# Patient Record
Sex: FEMALE | Race: WHITE | NOT HISPANIC OR LATINO | Employment: PART TIME | URBAN - METROPOLITAN AREA
[De-identification: names, ages, dates, MRNs, and addresses within clinical notes are randomized per-mention and may not be internally consistent; named-entity substitution may affect disease eponyms.]

---

## 2020-09-28 ENCOUNTER — OFFICE VISIT (OUTPATIENT)
Dept: OBSTETRICS AND GYNECOLOGY | Facility: CLINIC | Age: 80
End: 2020-09-28

## 2020-09-28 VITALS
WEIGHT: 129 LBS | SYSTOLIC BLOOD PRESSURE: 136 MMHG | DIASTOLIC BLOOD PRESSURE: 88 MMHG | BODY MASS INDEX: 22.86 KG/M2 | HEIGHT: 63 IN

## 2020-09-28 DIAGNOSIS — N81.6 CYSTOCELE WITH RECTOCELE: Primary | ICD-10-CM

## 2020-09-28 DIAGNOSIS — N76.0 ACUTE VAGINITIS: ICD-10-CM

## 2020-09-28 DIAGNOSIS — N81.10 CYSTOCELE WITH RECTOCELE: Primary | ICD-10-CM

## 2020-09-28 PROCEDURE — 99213 OFFICE O/P EST LOW 20 MIN: CPT | Performed by: OBSTETRICS & GYNECOLOGY

## 2020-09-28 RX ORDER — METRONIDAZOLE 7.5 MG/G
GEL VAGINAL
Qty: 70 G | Refills: 0 | Status: SHIPPED | OUTPATIENT
Start: 2020-09-28 | End: 2020-10-03

## 2020-09-28 RX ORDER — BRIMONIDINE TARTRATE/TIMOLOL 0.2%-0.5%
1 DROPS OPHTHALMIC (EYE) EVERY 12 HOURS
COMMUNITY

## 2020-09-28 RX ORDER — LATANOPROST 50 UG/ML
1 SOLUTION/ DROPS OPHTHALMIC DAILY
COMMUNITY

## 2020-09-28 RX ORDER — FOLIC ACID 1 MG/1
1 TABLET ORAL DAILY
COMMUNITY

## 2020-09-28 RX ORDER — METHOTREXATE 2.5 MG/1
12.5 TABLET ORAL WEEKLY
COMMUNITY

## 2020-09-28 RX ORDER — CONJUGATED ESTROGENS 0.62 MG/G
0.5 CREAM VAGINAL DAILY
COMMUNITY

## 2020-09-28 NOTE — PROGRESS NOTES
"Subjective   Chief Complaint   Patient presents with   • Gynecologic Exam     Postmenopausal bleeding     Kelle Edwards is a 80 y.o. year old who presents to be seen for follow-up of her pessary. Since she had her last visit she has had some spotting here and there but on Saturday she noticed blood in the toilet and on her pants. She denies any cramping or pain.     Overall she is unsure with how this pessary is doing.  She is not aware of it being present.  · She is not removing the pessary herself.  · She is able to empty were bladder without difficulty.  · There is not significant urinary incontinence.  She does not have trouble with urinary urgency or frequency.  · There is not significant vaginal discharge present.  She is using estrogen cream  3 times per week to help decrease her vaginal discharge.  · She is not having difficulty with bowel function.      OTHER THINGS SHE WANTS TO DISCUSS TODAY:  Vaginal bleeding that started 9/26/2020    The following portions of the patient's history were reviewed and updated as appropriate:current medications and allergies      Review of Systems   Constitutional: Negative.    HENT: Negative.    Eyes: Negative.    Respiratory: Negative.    Cardiovascular: Negative.    Gastrointestinal: Negative.    Endocrine: Negative.    Genitourinary: Positive for vaginal bleeding.   Musculoskeletal: Negative.    Skin: Negative.    Allergic/Immunologic: Negative.    Neurological: Negative.    Hematological: Negative.    Psychiatric/Behavioral: Negative.              Objective   /88   Ht 160 cm (63\")   Wt 58.5 kg (129 lb)   BMI 22.85 kg/m²     Physical Exam  Vitals signs and nursing note reviewed. Exam conducted with a chaperone present.   Constitutional:       General: She is not in acute distress.     Appearance: Normal appearance. She is well-developed and well-groomed. She is not ill-appearing or toxic-appearing.   HENT:      Head: Normocephalic and atraumatic.   Neck:      " Musculoskeletal: Normal range of motion.   Pulmonary:      Effort: Pulmonary effort is normal. No respiratory distress or retractions.   Abdominal:      Palpations: Abdomen is soft. There is no mass.      Tenderness: There is no abdominal tenderness. There is no guarding or rebound.   Genitourinary:     Exam position: Lithotomy position.      Pubic Area: No rash.       Labia:         Right: No rash, tenderness, lesion or injury.         Left: No rash, tenderness, lesion or injury.       Urethra: No prolapse, urethral pain, urethral swelling or urethral lesion.      Vagina: Signs of injury (lesion and injury noted most prominently in the right sulcus from pessary, these are moderate in nature and contact bleeding is noted, no active bleeding in vagina noted,  no obvious tear/cut noted, but only abrasions from pessary present) present. Foreign body: pessary removed and cleaned; pessary was given to pt to take home and keep. Erythema, bleeding and lesions present. No vaginal discharge or tenderness. Other prolapse of vaginal walls w/o mention of uterine prolapse: cystocele and rectocele present.      Cervix: Lesions: cervix absent.      Uterus: Absent.       Adnexa: Right adnexa normal and left adnexa normal.        Right: No mass, tenderness or fullness.          Left: No mass, tenderness or fullness.     Neurological:      Mental Status: She is alert and oriented to person, place, and time.   Psychiatric:         Mood and Affect: Mood and affect normal.         Speech: Speech normal.         Behavior: Behavior is cooperative.          Problem List Items Addressed This Visit        Digestive    Cystocele with rectocele - Primary    Relevant Medications    conjugated estrogens (Premarin) 0.625 MG/GM vaginal cream      Other Visit Diagnoses     Acute vaginitis                  Assessment   1. Symptomatic prolapse - well controlled with current pessary.  2.      Plan   1. Her pessary was removed, cleaned and  replaced.  2. The importance of keeping all planned follow-up and taking all medications as prescribed was emphasized.  3. Return in about 2 weeks (around 10/12/2020) for Recheck.   4.  Pt with some erosions present, this was exacerbated by recent constipation.  Will leave pessary out for 2 weeks and will treat with metrogel and vagina E, pt to RTC 2 weeks for reexamination.               Nimo Luevano MD   September 28, 2020

## 2020-10-26 ENCOUNTER — OFFICE VISIT (OUTPATIENT)
Dept: OBSTETRICS AND GYNECOLOGY | Facility: CLINIC | Age: 80
End: 2020-10-26

## 2020-10-26 VITALS
SYSTOLIC BLOOD PRESSURE: 120 MMHG | BODY MASS INDEX: 23.03 KG/M2 | DIASTOLIC BLOOD PRESSURE: 70 MMHG | WEIGHT: 130 LBS | TEMPERATURE: 96.4 F

## 2020-10-26 DIAGNOSIS — N81.6 CYSTOCELE WITH RECTOCELE: Primary | ICD-10-CM

## 2020-10-26 DIAGNOSIS — N81.10 CYSTOCELE WITH RECTOCELE: Primary | ICD-10-CM

## 2020-10-26 PROCEDURE — 99212 OFFICE O/P EST SF 10 MIN: CPT | Performed by: OBSTETRICS & GYNECOLOGY

## 2020-10-26 NOTE — PROGRESS NOTES
Subjective   Chief Complaint   Patient presents with   • Follow-up     Kelle Edwards is a 80 y.o. year old who presents to be seen for follow-up of her pessary.    Overall she is satisfied with how this pessary is doing.  She is not aware of it being present.  · She is not removing the pessary herself.  · She is able to empty were bladder without difficulty.  · There is not significant urinary incontinence.  She does not have trouble with urinary urgency or frequency.  · There is not significant vaginal discharge present.  She is using Trimosan cream 2 times per week and estrogen cream  1 times per week to help decrease her vaginal discharge.  · She is not having difficulty with bowel function.     OTHER THINGS SHE WANTS TO DISCUSS TODAY:  Nothing else    The following portions of the patient's history were reviewed and updated as appropriate:current medications and allergies      Review of Systems   All other systems reviewed and are negative.            Objective   /70   Temp 96.4 °F (35.8 °C)   Wt 59 kg (130 lb)   BMI 23.03 kg/m²     Physical Exam  Vitals signs and nursing note reviewed. Exam conducted with a chaperone present.   Constitutional:       Appearance: Normal appearance. She is normal weight.   HENT:      Head: Normocephalic and atraumatic.   Pulmonary:      Effort: No respiratory distress or retractions.   Genitourinary:     General: Normal vulva.      Exam position: Lithotomy position.      Pubic Area: No rash.       Labia:         Right: No rash, tenderness, lesion or injury.         Left: No rash, tenderness, lesion or injury.       Vagina: No foreign body. No erythema, tenderness or bleeding. Vaginal discharge: the previously noted areas of bleeding and irritation are much improved.  One area in right sulcus was mildly irritated, silver nitrate applied.      Uterus: Absent.    Neurological:      Mental Status: She is alert.          Problems Addressed this Visit        Digestive     Cystocele with rectocele - Primary      Diagnoses       Codes Comments    Cystocele with rectocele    -  Primary ICD-10-CM: N81.10, N81.6  ICD-9-CM: 618.01, 618.04               Assessment   1. Symptomatic prolapse - well controlled with current pessary.     Plan   1. Her pessary was attempted to be replaced today, but was too large.  PT will bring her other pessary to clinic in 1-2 weeks and we will see if that fits better.  I believe the smaller pessary will be optimal.  2. The importance of keeping all planned follow-up and taking all medications as prescribed was emphasized.  3. Return for Recheck in 1-2 weeks.                Nimo Luevano MD   October 26, 2020

## 2020-11-09 ENCOUNTER — OFFICE VISIT (OUTPATIENT)
Dept: OBSTETRICS AND GYNECOLOGY | Facility: CLINIC | Age: 80
End: 2020-11-09

## 2020-11-09 VITALS
SYSTOLIC BLOOD PRESSURE: 132 MMHG | DIASTOLIC BLOOD PRESSURE: 78 MMHG | BODY MASS INDEX: 23.56 KG/M2 | TEMPERATURE: 97.8 F | WEIGHT: 133 LBS

## 2020-11-09 DIAGNOSIS — N81.10 CYSTOCELE WITH RECTOCELE: Primary | ICD-10-CM

## 2020-11-09 DIAGNOSIS — N81.6 CYSTOCELE WITH RECTOCELE: Primary | ICD-10-CM

## 2020-11-09 PROCEDURE — 99213 OFFICE O/P EST LOW 20 MIN: CPT | Performed by: OBSTETRICS & GYNECOLOGY

## 2020-11-09 NOTE — PROGRESS NOTES
Subjective   Chief Complaint   Patient presents with   • Pessary Check     Kelle Edwards is a 80 y.o. year old who presents to be seen for follow-up of her pessary.    Overall she is satisfied with how this pessary is doing.  She is not aware of it being present.  · She is removing the pessary herself.  · She is able to empty were bladder without difficulty.  · There is not significant urinary incontinence.  She does not have trouble with urinary urgency or frequency.  · There is not significant vaginal discharge present.  She is using Trimosan cream 2 times per week and estrogen cream  1 times per week to help decrease her vaginal discharge.  She is not having difficulty with bowel function.   Pt is here to have her pessary put in.  She brought 2 pessary from home.    PT States she is uncomfortable without them.     OTHER THINGS SHE WANTS TO DISCUSS TODAY:  Nothing else    The following portions of the patient's history were reviewed and updated as appropriate:no additional history reviewed    Past Medical History:   Diagnosis Date   • Hypertension    • Rheumatoid arthritis (CMS/HCC)      Past Surgical History:   Procedure Laterality Date   • BREAST BIOPSY     • CYSTOSCOPY      Dr Mathews   • KNEE SURGERY      broken leg - has plates and screws   • SPLENECTOMY     • VAGINAL HYSTERECTOMY SALPINGO OOPHORECTOMY      late 90's or early 2000's         Review of Systems   All other systems reviewed and are negative.            Objective   /78   Temp 97.8 °F (36.6 °C)   Wt 60.3 kg (133 lb)   BMI 23.56 kg/m²     Physical Exam  Vitals signs and nursing note reviewed. Exam conducted with a chaperone present.   Constitutional:       General: She is not in acute distress.     Appearance: Normal appearance. She is normal weight.   HENT:      Head: Normocephalic and atraumatic.   Pulmonary:      Effort: Pulmonary effort is normal. No accessory muscle usage or respiratory distress.   Genitourinary:     General: Normal vulva.       Exam position: Lithotomy position.      Pubic Area: No rash.       Labia:         Right: No rash, tenderness, lesion or injury.         Left: No rash, tenderness, lesion or injury.       Vagina: No foreign body. Lesions (previously noted erosion is much improved, well healed) present. No vaginal discharge, erythema or tenderness. Bleeding: contact bleeding noted. Other prolapse of vaginal walls w/o mention of uterine prolapse: cystocele and rectocele noted.      Uterus: Absent.       Adnexa: Right adnexa normal and left adnexa normal.   Neurological:      Mental Status: She is alert.          Problems Addressed this Visit     None      Diagnoses    None.             Assessment   1. Symptomatic prolapse - {DESC; WELL/FAIRLY      Plan   1. She has not had her pessary in for some time and is overall doing well. We tried multiple pessaries today, but none fit well.  She desires expt mgt for now and will RTC if she desires another trial of pessary.  Continue vaginal E for now.   2. The importance of keeping all planned follow-up and taking all medications as prescribed was emphasized.  3. No follow-ups on file.                Aziza Khan   November 9, 2020

## 2021-02-08 ENCOUNTER — TELEPHONE (OUTPATIENT)
Dept: OBSTETRICS AND GYNECOLOGY | Facility: CLINIC | Age: 81
End: 2021-02-08

## 2021-02-08 NOTE — TELEPHONE ENCOUNTER
Patient is having issues and is currently leaking blood and etc. She was here and couldn't get her pessary placed so she has questions on what she should do or if she needs to be seen.

## 2021-02-22 ENCOUNTER — OFFICE VISIT (OUTPATIENT)
Dept: OBSTETRICS AND GYNECOLOGY | Facility: CLINIC | Age: 81
End: 2021-02-22

## 2021-02-22 VITALS
HEIGHT: 63 IN | WEIGHT: 133 LBS | BODY MASS INDEX: 23.57 KG/M2 | DIASTOLIC BLOOD PRESSURE: 78 MMHG | SYSTOLIC BLOOD PRESSURE: 118 MMHG

## 2021-02-22 DIAGNOSIS — N76.0 ACUTE VAGINITIS: Primary | ICD-10-CM

## 2021-02-22 DIAGNOSIS — Z12.31 BREAST CANCER SCREENING BY MAMMOGRAM: ICD-10-CM

## 2021-02-22 DIAGNOSIS — N81.6 CYSTOCELE WITH RECTOCELE: ICD-10-CM

## 2021-02-22 DIAGNOSIS — N81.10 CYSTOCELE WITH RECTOCELE: ICD-10-CM

## 2021-02-22 PROCEDURE — 99212 OFFICE O/P EST SF 10 MIN: CPT | Performed by: OBSTETRICS & GYNECOLOGY

## 2021-02-22 RX ORDER — METRONIDAZOLE 7.5 MG/G
GEL VAGINAL NIGHTLY
Qty: 70 G | Refills: 3 | Status: SHIPPED | OUTPATIENT
Start: 2021-02-22 | End: 2021-05-28

## 2021-02-22 RX ORDER — AMLODIPINE BESYLATE AND BENAZEPRIL HYDROCHLORIDE 5; 20 MG/1; MG/1
1 CAPSULE ORAL DAILY
COMMUNITY
Start: 2021-01-15

## 2021-02-22 RX ORDER — AMOXICILLIN 500 MG/1
CAPSULE ORAL
COMMUNITY
Start: 2020-12-14 | End: 2021-05-28

## 2021-02-22 NOTE — PROGRESS NOTES
Chief Complaint   Patient presents with   • Pessary Check       Subjective   HPI  Kelle Edwards is a 80 y.o. female, , who presents for pessary placement.      Her last LMP was No LMP recorded. Patient has had a hysterectomy. Patient reports problems with: none.  Partner Status: Marital Status: .  New Partners since last visit: no.  Desires STD Screening: no.    Additional OB/GYN History   Current contraception: contraceptive methods: Post menopausal status  Desires to: do not start contraception  Last Pap :   Last Completed Pap Smear     Patient has no health maintenance due at this time        History of abnormal Pap smear: no  Last mammogram:   Last Completed Mammogram     Patient has no health maintenance due at this time        Tobacco Usage?: No   OB History        2    Para   2    Term   2            AB        Living   2       SAB        TAB        Ectopic        Molar        Multiple        Live Births   2                Health Maintenance   Topic Date Due   • DXA SCAN  1940   • TDAP/TD VACCINES (1 - Tdap) 1960   • ZOSTER VACCINE (1 of 2) 1991   • Pneumococcal Vaccine 65+ (1 of 1 - PPSV23) 2006   • INFLUENZA VACCINE  2020   • HEPATITIS C SCREENING  2020   • ANNUAL WELLNESS VISIT  2020   • MENINGOCOCCAL VACCINE  Aged Out       Past Surgical History:   Procedure Laterality Date   • BREAST BIOPSY     • CYSTOSCOPY      Dr Mathews   • KNEE SURGERY      broken leg - has plates and screws   • SPLENECTOMY     • VAGINAL HYSTERECTOMY SALPINGO OOPHORECTOMY      late  or early          The additional following portions of the patient's history were reviewed and updated as appropriate: allergies, current medications, past family history, past medical history, past social history, past surgical history and problem list.    Review of Systems   Constitutional: Negative.    HENT: Negative.    Eyes: Negative.    Respiratory: Negative.    Cardiovascular:  "Negative.    Gastrointestinal: Negative.    Endocrine: Negative.    Genitourinary: Negative.    Musculoskeletal: Negative.    Skin: Negative.    Allergic/Immunologic: Negative.    Neurological: Negative.    Hematological: Negative.    Psychiatric/Behavioral: Negative.        I have reviewed and agree with the HPI, ROS, and historical information as entered above. Nimo Luevano MD    Objective   /78   Ht 160 cm (63\")   Wt 60.3 kg (133 lb)   Breastfeeding No   BMI 23.56 kg/m²     Physical Exam  Vitals signs and nursing note reviewed. Exam conducted with a chaperone present.   HENT:      Head: Normocephalic and atraumatic.   Pulmonary:      Effort: Pulmonary effort is normal. No accessory muscle usage.   Abdominal:      Palpations: Abdomen is soft.   Genitourinary:     General: Normal vulva.      Exam position: Lithotomy position.      Labia:         Right: No rash, tenderness or lesion.         Left: No rash, tenderness or lesion.       Urethra: No urethral pain, urethral swelling or urethral lesion.      Vagina: Normal. No vaginal discharge, erythema, tenderness, bleeding or lesions.      Uterus: Absent.       Rectum: No external hemorrhoid.      Comments: Pessary reinserted without difficulty; size 3 ring with support was placed today with good placement and fit.  Pt was able to maintain pessary in place with movement, straining and was able to void without difficulty and retained the pessary well.   Neurological:      Mental Status: She is alert and oriented to person, place, and time.   Psychiatric:         Mood and Affect: Mood and affect normal.         Speech: Speech normal.         Assessment/Plan     Assessment     Problem List Items Addressed This Visit        Genitourinary and Reproductive     Cystocele with rectocele    Relevant Medications    conjugated estrogens (Premarin) 0.625 MG/GM vaginal cream    metroNIDAZOLE (METROGEL VAGINAL) 0.75 % vaginal gel      Other Visit Diagnoses     Acute " vaginitis    -  Primary    Relevant Medications    metroNIDAZOLE (METROGEL VAGINAL) 0.75 % vaginal gel    Breast cancer screening by mammogram        Relevant Orders    Mammo Screening Digital Tomosynthesis Bilateral With CAD            Plan     Return in about 3 months (around 5/22/2021) for Recheck.  1. Size 3 ring with support placed today and fit well.      Nimo Luevano MD  02/22/2021

## 2021-03-23 DIAGNOSIS — N81.10 CYSTOCELE WITH RECTOCELE: Primary | ICD-10-CM

## 2021-03-23 DIAGNOSIS — N81.6 CYSTOCELE WITH RECTOCELE: Primary | ICD-10-CM

## 2021-05-05 ENCOUNTER — APPOINTMENT (OUTPATIENT)
Dept: OTHER | Facility: HOSPITAL | Age: 81
End: 2021-05-05

## 2021-05-05 ENCOUNTER — HOSPITAL ENCOUNTER (OUTPATIENT)
Dept: MAMMOGRAPHY | Facility: HOSPITAL | Age: 81
Discharge: HOME | End: 2021-05-05
Admitting: OBSTETRICS & GYNECOLOGY

## 2021-05-05 DIAGNOSIS — Z12.31 BREAST CANCER SCREENING BY MAMMOGRAM: ICD-10-CM

## 2021-05-05 PROCEDURE — 77063 BREAST TOMOSYNTHESIS BI: CPT

## 2021-05-05 PROCEDURE — 77067 SCR MAMMO BI INCL CAD: CPT | Performed by: RADIOLOGY

## 2021-05-05 PROCEDURE — 77063 BREAST TOMOSYNTHESIS BI: CPT | Performed by: RADIOLOGY

## 2021-05-05 PROCEDURE — 77067 SCR MAMMO BI INCL CAD: CPT

## 2021-05-21 ENCOUNTER — HOSPITAL ENCOUNTER (OUTPATIENT)
Dept: ULTRASOUND IMAGING | Facility: HOSPITAL | Age: 81
Discharge: HOME | End: 2021-05-21

## 2021-05-21 ENCOUNTER — HOSPITAL ENCOUNTER (OUTPATIENT)
Dept: MAMMOGRAPHY | Facility: HOSPITAL | Age: 81
Discharge: HOME | End: 2021-05-21

## 2021-05-21 ENCOUNTER — TRANSCRIBE ORDERS (OUTPATIENT)
Dept: MAMMOGRAPHY | Facility: HOSPITAL | Age: 81
End: 2021-05-21

## 2021-05-21 DIAGNOSIS — R92.8 ABNORMAL MAMMOGRAM: ICD-10-CM

## 2021-05-21 DIAGNOSIS — R92.8 ABNORMAL MAMMOGRAM: Primary | ICD-10-CM

## 2021-05-21 PROCEDURE — 77065 DX MAMMO INCL CAD UNI: CPT

## 2021-05-21 PROCEDURE — 76642 ULTRASOUND BREAST LIMITED: CPT | Performed by: RADIOLOGY

## 2021-05-21 PROCEDURE — G0279 TOMOSYNTHESIS, MAMMO: HCPCS

## 2021-05-21 PROCEDURE — 76642 ULTRASOUND BREAST LIMITED: CPT

## 2021-05-21 PROCEDURE — 77065 DX MAMMO INCL CAD UNI: CPT | Performed by: RADIOLOGY

## 2021-05-21 PROCEDURE — G0279 TOMOSYNTHESIS, MAMMO: HCPCS | Performed by: RADIOLOGY

## 2021-05-28 ENCOUNTER — HOSPITAL ENCOUNTER (OUTPATIENT)
Dept: CARDIOLOGY | Facility: HOSPITAL | Age: 81
Discharge: HOME | End: 2021-05-28
Admitting: PHYSICIAN ASSISTANT

## 2021-05-28 ENCOUNTER — OFFICE VISIT (OUTPATIENT)
Dept: CARDIOLOGY | Facility: CLINIC | Age: 81
End: 2021-05-28

## 2021-05-28 ENCOUNTER — ANESTHESIA EVENT (OUTPATIENT)
Dept: PERIOP | Facility: HOSPITAL | Age: 81
End: 2021-05-28

## 2021-05-28 ENCOUNTER — PRE-ADMISSION TESTING (OUTPATIENT)
Dept: PREADMISSION TESTING | Facility: HOSPITAL | Age: 81
End: 2021-05-28

## 2021-05-28 VITALS
OXYGEN SATURATION: 98 % | SYSTOLIC BLOOD PRESSURE: 132 MMHG | HEART RATE: 69 BPM | WEIGHT: 130 LBS | BODY MASS INDEX: 23.92 KG/M2 | HEIGHT: 62 IN | DIASTOLIC BLOOD PRESSURE: 82 MMHG

## 2021-05-28 VITALS — BODY MASS INDEX: 24.02 KG/M2 | HEIGHT: 62 IN | WEIGHT: 130.51 LBS

## 2021-05-28 DIAGNOSIS — R06.02 SOB (SHORTNESS OF BREATH): Primary | ICD-10-CM

## 2021-05-28 DIAGNOSIS — R06.02 SOB (SHORTNESS OF BREATH): ICD-10-CM

## 2021-05-28 LAB
ANION GAP SERPL CALCULATED.3IONS-SCNC: 9 MMOL/L (ref 5–15)
BUN SERPL-MCNC: 15 MG/DL (ref 8–23)
BUN/CREAT SERPL: 25.4 (ref 7–25)
CALCIUM SPEC-SCNC: 9.7 MG/DL (ref 8.6–10.5)
CHLORIDE SERPL-SCNC: 105 MMOL/L (ref 98–107)
CO2 SERPL-SCNC: 25 MMOL/L (ref 22–29)
CREAT SERPL-MCNC: 0.59 MG/DL (ref 0.57–1)
DEPRECATED RDW RBC AUTO: 49.4 FL (ref 37–54)
ERYTHROCYTE [DISTWIDTH] IN BLOOD BY AUTOMATED COUNT: 14.6 % (ref 12.3–15.4)
GFR SERPL CREATININE-BSD FRML MDRD: 98 ML/MIN/1.73
GLUCOSE SERPL-MCNC: 111 MG/DL (ref 65–99)
HBA1C MFR BLD: 5.3 % (ref 4.8–5.6)
HCT VFR BLD AUTO: 40 % (ref 34–46.6)
HGB BLD-MCNC: 12.9 G/DL (ref 12–15.9)
MCH RBC QN AUTO: 30.1 PG (ref 26.6–33)
MCHC RBC AUTO-ENTMCNC: 32.3 G/DL (ref 31.5–35.7)
MCV RBC AUTO: 93.2 FL (ref 79–97)
PLATELET # BLD AUTO: 295 10*3/MM3 (ref 140–450)
PMV BLD AUTO: 10.8 FL (ref 6–12)
POTASSIUM SERPL-SCNC: 4.6 MMOL/L (ref 3.5–5.2)
QT INTERVAL: 498 MS
QTC INTERVAL: 444 MS
RBC # BLD AUTO: 4.29 10*6/MM3 (ref 3.77–5.28)
SODIUM SERPL-SCNC: 139 MMOL/L (ref 136–145)
WBC # BLD AUTO: 5.69 10*3/MM3 (ref 3.4–10.8)

## 2021-05-28 PROCEDURE — 36415 COLL VENOUS BLD VENIPUNCTURE: CPT

## 2021-05-28 PROCEDURE — 80048 BASIC METABOLIC PNL TOTAL CA: CPT

## 2021-05-28 PROCEDURE — 93005 ELECTROCARDIOGRAM TRACING: CPT

## 2021-05-28 PROCEDURE — 83036 HEMOGLOBIN GLYCOSYLATED A1C: CPT

## 2021-05-28 PROCEDURE — 99203 OFFICE O/P NEW LOW 30 MIN: CPT | Performed by: PHYSICIAN ASSISTANT

## 2021-05-28 PROCEDURE — 85027 COMPLETE CBC AUTOMATED: CPT

## 2021-05-28 PROCEDURE — 93010 ELECTROCARDIOGRAM REPORT: CPT | Performed by: INTERNAL MEDICINE

## 2021-05-28 PROCEDURE — 93306 TTE W/DOPPLER COMPLETE: CPT

## 2021-05-28 PROCEDURE — 93306 TTE W/DOPPLER COMPLETE: CPT | Performed by: INTERNAL MEDICINE

## 2021-05-28 PROCEDURE — 93000 ELECTROCARDIOGRAM COMPLETE: CPT | Performed by: PHYSICIAN ASSISTANT

## 2021-05-28 NOTE — PROGRESS NOTES
[facility]  INITIAL OFFICE CONSULT      Kelle Edwards  1940  PCP: dE Jung MD    SUBJECTIVE:   Kelle Edwards is a 81 y.o. female seen for a consultation visit regarding the following:     Chief Complaint:   Chief Complaint   Patient presents with   • cardiac clearance     bladder tack  June 2nd          Consultation is requested by No ref. provider found for evaluation of cardiac clearance (bladder tack  June 2nd)        History:  Pleasant 81-year-old female presents today for consultation preop clearance prior to bladder tack surgery at request of Dr. Russell.  The patient is a delightful person who is quite active and enjoys exercising and doing things around the house.  She states she is never had any previous cardiac history.  She does not recall ever having EKG.  She denies having any chest pain or chest tightness that suggest angina pectoris.  She denies any heart failure symptoms such as orthopnea PND presently.  She denies any fatigue weakness dizziness near syncope or syncope.  She denies any palpitations.  Overall she feels she is in good health she was exercise quite frequently but started having some difficulty with her bladder dysfunction and therefore is now seeking out a procedure to improve this.  She states she underwent preadmission testing evaluation today with an EKG that was considered abnormal with left bundle branch block.  In view this findings referred to our office.      Cardiac PMH: (Old records have been reviewed and summarized below)  1. Pre op evaluation  2. LBBB  3. Asymptomatic sinus bradycardia first-degree AV block  4. HTN  5. Bladder dysfunction.         Past Medical History, Past Surgical History, Family history, Social History, and Medications were all reviewed with the patient today and updated as necessary.     Current Outpatient Medications   Medication Sig Dispense Refill   • amLODIPine-benazepril (LOTREL 5-20) 5-20 MG per  capsule Take 1 capsule by mouth Daily.     • brimonidine-timolol (Combigan) 0.2-0.5 % ophthalmic solution Administer 1 drop to both eyes Every 12 (Twelve) Hours.     • CALCIUM PO Take 1 dose by mouth Daily.     • conjugated estrogens (Premarin) 0.625 MG/GM vaginal cream Insert 0.5 g into the vagina Daily.     • folic acid (FOLVITE) 1 MG tablet Take 1 mg by mouth Daily.     • latanoprost (XALATAN) 0.005 % ophthalmic solution Administer 1 drop to both eyes Daily.     • methotrexate 2.5 MG tablet Take 12.5 mg by mouth 1 (One) Time Per Week.     • Multiple Vitamins-Minerals (CENTRUM SILVER ULTRA WOMENS PO) Take 1 tablet by mouth Daily.       No current facility-administered medications for this visit.     No Known Allergies      Past Medical History:   Diagnosis Date   • Diverticulosis    • History of transfusion 1959    NO REACTION   • Hypertension    • Insomnia    • Rheumatoid arthritis (CMS/HCC)      Past Surgical History:   Procedure Laterality Date   • BREAST BIOPSY     • COLONOSCOPY  2018   • CYSTOSCOPY      Dr Mathews   • KNEE SURGERY      broken leg - has plates and screws   • OOPHORECTOMY     • SPLENECTOMY     • VAGINAL HYSTERECTOMY SALPINGO OOPHORECTOMY      late 90's or early 2000's     Family History   Problem Relation Age of Onset   • Heart disease Father    • Heart disease Brother    • Breast cancer Neg Hx    • Ovarian cancer Neg Hx      Social History     Tobacco Use   • Smoking status: Never Smoker   • Smokeless tobacco: Never Used   Substance Use Topics   • Alcohol use: Not Currently       ROS:  Review of Symptoms:  General: no recent weight loss/gain, weakness or fatigue  Skin: no rashes, lumps, or other skin changes  HEENT: no dizziness, lightheadedness, or vision changes  Respiratory: no cough or hemoptysis  Cardiovascular: no palpitations, and tachycardia  Gastrointestinal: no black/tarry stools or diarrhea  Urinary: no change in frequency or urgency  Peripheral Vascular: no claudication or leg  "cramps  Musculoskeletal: no muscle or joint pain/stiffness  Psychiatric: no depression or excessive stress  Neurological: no sensory or motor loss, no syncope  Hematologic: no anemia, easy bruising or bleeding  Endocrine: no thyroid problems, nor heat or cold intolerance         PHYSICAL EXAM:   /82   Pulse 69   Ht 157.5 cm (62.01\")   Wt 59 kg (130 lb)   SpO2 98%   BMI 23.77 kg/m²      Wt Readings from Last 5 Encounters:   05/28/21 59 kg (130 lb)   05/28/21 59.2 kg (130 lb 8.2 oz)   02/22/21 60.3 kg (133 lb)   11/09/20 60.3 kg (133 lb)   10/26/20 59 kg (130 lb)     BP Readings from Last 5 Encounters:   05/28/21 132/82   02/22/21 118/78   11/09/20 132/78   10/26/20 120/70   09/28/20 136/88       General-Well Nourished, Well developed  Eyes - PERRLA  Neck- supple, No mass  CV- regular rate and rhythm, no MRG  Lung- clear bilaterally  Abd- soft, +BS  Musc/skel - Norm strength and range of motion  Skin- warm and dry  Neuro - Alert & Oriented x 3, appropriate mood.    Patient's external notes were reviewed.  Independent interpretation of test performed by another physician in facility were reviewed.  Outside laboratory data was also reviewed.    Medical problems and test results were reviewed with the patient today.     Results for orders placed or performed in visit on 05/28/21   CBC (No Diff)    Specimen: Blood   Result Value Ref Range    WBC 5.69 3.40 - 10.80 10*3/mm3    RBC 4.29 3.77 - 5.28 10*6/mm3    Hemoglobin 12.9 12.0 - 15.9 g/dL    Hematocrit 40.0 34.0 - 46.6 %    MCV 93.2 79.0 - 97.0 fL    MCH 30.1 26.6 - 33.0 pg    MCHC 32.3 31.5 - 35.7 g/dL    RDW 14.6 12.3 - 15.4 %    RDW-SD 49.4 37.0 - 54.0 fl    MPV 10.8 6.0 - 12.0 fL    Platelets 295 140 - 450 10*3/mm3   Basic Metabolic Panel    Specimen: Blood   Result Value Ref Range    Glucose 111 (H) 65 - 99 mg/dL    BUN 15 8 - 23 mg/dL    Creatinine 0.59 0.57 - 1.00 mg/dL    Sodium 139 136 - 145 mmol/L    Potassium 4.6 3.5 - 5.2 mmol/L    Chloride 105 98 " - 107 mmol/L    CO2 25.0 22.0 - 29.0 mmol/L    Calcium 9.7 8.6 - 10.5 mg/dL    eGFR Non African Amer 98 >60 mL/min/1.73    BUN/Creatinine Ratio 25.4 (H) 7.0 - 25.0    Anion Gap 9.0 5.0 - 15.0 mmol/L   Hemoglobin A1c    Specimen: Blood   Result Value Ref Range    Hemoglobin A1C 5.30 4.80 - 5.60 %   ECG 12 Lead   Result Value Ref Range    QT Interval 498 ms    QTC Interval 444 ms         No results found for: CHOL, HDL, HDLC, LDL, LDLC, VLDL    EKG:  (EKG/Tracing has been independently visualized by me and summarized below)      ECG 12 Lead    Date/Time: 5/28/2021 2:48 PM  Performed by: Ferny Guillaume PA  Authorized by: Ferny Guillaume PA   Rhythm: sinus rhythm  Rate: bradycardic  Conduction: left bundle branch block and 1st degree AV block  QRS axis: normal    Clinical impression: abnormal EKG            ASSESSMENT   1.  Left bundle branch block  2.  Asymptomatic sinus bradycardia first-degree AV block  3.  Hypertension well-controlled current medical therapy.      PLAN  · Reviewed EKG with the patient.  She did denies having any symptoms of bradycardia she has no dizziness near syncope or syncope.  She states she is quite active has good energy.  She has no chest pain suggesting angina or heart failure symptoms.  Recommend pursuing echo according to LV function.  If this is normal will consider patient acceptable cardiovascular risk to pursue bladder tack surgery.       Cardiology/Electrophysiology  05/28/21  14:44 EDT  Will Markell BATES

## 2021-05-28 NOTE — PAT
An arrival time for procedure was not given during PAT visit. If patient had any questions or concerns about their arrival time, they were instructed to contact their surgeon/physician.  Additionally, if the patient referred to an arrival time that was acquired from their my chart account, patient was encouraged to verify that time with their surgeon/physician.  NO arrival times given in Pre Admission Testing Department.    Patient to apply Chlorhexadine wipes  to surgical area (as instructed) the night before procedure and the AM of procedure. Wipes provided.    PER DR. KOTHARI, PT NEEDS CARDIAC CLEARANCE. REPORTED NEED FOR CLEARANCE TO KENNETH IN DR. POWELL'S OFFICE. NO PREFERENCE FOR CARDIOLOGIST. LM WITH APPOINTMENT SCHEDULING AT [facility] TO SCHEDULE APPOINTMENT. PT AWARE THAT SHE WILL BE CALLED WITH A CARDIOLOGY APPOINTMENT AND VERBALIZED UNDERSTANDING.

## 2021-05-29 LAB
BH CV ECHO MEAS - AO MAX PG (FULL): 2.4 MMHG
BH CV ECHO MEAS - AO MAX PG: 7 MMHG
BH CV ECHO MEAS - AO MEAN PG (FULL): 1.3 MMHG
BH CV ECHO MEAS - AO MEAN PG: 3.4 MMHG
BH CV ECHO MEAS - AO ROOT AREA (BSA CORRECTED): 1.8
BH CV ECHO MEAS - AO ROOT AREA: 6.4 CM^2
BH CV ECHO MEAS - AO ROOT DIAM: 2.9 CM
BH CV ECHO MEAS - AO V2 MAX: 132 CM/SEC
BH CV ECHO MEAS - AO V2 MEAN: 85.2 CM/SEC
BH CV ECHO MEAS - AO V2 VTI: 29.1 CM
BH CV ECHO MEAS - ASC AORTA: 3.2 CM
BH CV ECHO MEAS - AVA(I,A): 2.5 CM^2
BH CV ECHO MEAS - AVA(I,D): 2.5 CM^2
BH CV ECHO MEAS - AVA(V,A): 2.4 CM^2
BH CV ECHO MEAS - AVA(V,D): 2.4 CM^2
BH CV ECHO MEAS - BSA(HAYCOCK): 1.6 M^2
BH CV ECHO MEAS - BSA: 1.6 M^2
BH CV ECHO MEAS - BZI_BMI: 23.8 KILOGRAMS/M^2
BH CV ECHO MEAS - BZI_METRIC_HEIGHT: 157.5 CM
BH CV ECHO MEAS - BZI_METRIC_WEIGHT: 59 KG
BH CV ECHO MEAS - EDV(CUBED): 103.6 ML
BH CV ECHO MEAS - EDV(MOD-SP2): 61 ML
BH CV ECHO MEAS - EDV(MOD-SP4): 95 ML
BH CV ECHO MEAS - EDV(TEICH): 102.2 ML
BH CV ECHO MEAS - EF(CUBED): 71.5 %
BH CV ECHO MEAS - EF(MOD-SP2): 65.6 %
BH CV ECHO MEAS - EF(MOD-SP4): 63.2 %
BH CV ECHO MEAS - EF(TEICH): 63.2 %
BH CV ECHO MEAS - ESV(CUBED): 29.5 ML
BH CV ECHO MEAS - ESV(MOD-SP2): 21 ML
BH CV ECHO MEAS - ESV(MOD-SP4): 35 ML
BH CV ECHO MEAS - ESV(TEICH): 37.6 ML
BH CV ECHO MEAS - FS: 34.2 %
BH CV ECHO MEAS - IVS/LVPW: 0.99
BH CV ECHO MEAS - IVSD: 0.69 CM
BH CV ECHO MEAS - LA DIMENSION: 3 CM
BH CV ECHO MEAS - LA/AO: 1.1
BH CV ECHO MEAS - LAD MAJOR: 4.5 CM
BH CV ECHO MEAS - LAT PEAK E' VEL: 9.8 CM/SEC
BH CV ECHO MEAS - LATERAL E/E' RATIO: 5.4
BH CV ECHO MEAS - LV DIASTOLIC VOL/BSA (35-75): 59.7 ML/M^2
BH CV ECHO MEAS - LV IVRT: 0.1 SEC
BH CV ECHO MEAS - LV MASS(C)D: 102.4 GRAMS
BH CV ECHO MEAS - LV MASS(C)DI: 64.4 GRAMS/M^2
BH CV ECHO MEAS - LV MAX PG: 4.5 MMHG
BH CV ECHO MEAS - LV MEAN PG: 2.1 MMHG
BH CV ECHO MEAS - LV SYSTOLIC VOL/BSA (12-30): 22 ML/M^2
BH CV ECHO MEAS - LV V1 MAX: 106.6 CM/SEC
BH CV ECHO MEAS - LV V1 MEAN: 64.5 CM/SEC
BH CV ECHO MEAS - LV V1 VTI: 24.1 CM
BH CV ECHO MEAS - LVIDD: 4.7 CM
BH CV ECHO MEAS - LVIDS: 3.1 CM
BH CV ECHO MEAS - LVLD AP2: 7.1 CM
BH CV ECHO MEAS - LVLD AP4: 7.6 CM
BH CV ECHO MEAS - LVLS AP2: 5.5 CM
BH CV ECHO MEAS - LVLS AP4: 6.7 CM
BH CV ECHO MEAS - LVOT AREA (M): 3.1 CM^2
BH CV ECHO MEAS - LVOT AREA: 3 CM^2
BH CV ECHO MEAS - LVOT DIAM: 2 CM
BH CV ECHO MEAS - LVPWD: 0.7 CM
BH CV ECHO MEAS - MED PEAK E' VEL: 9.1 CM/SEC
BH CV ECHO MEAS - MEDIAL E/E' RATIO: 5.8
BH CV ECHO MEAS - MV A MAX VEL: 99.7 CM/SEC
BH CV ECHO MEAS - MV DEC SLOPE: 285.8 CM/SEC^2
BH CV ECHO MEAS - MV DEC TIME: 0.26 SEC
BH CV ECHO MEAS - MV E MAX VEL: 53.8 CM/SEC
BH CV ECHO MEAS - MV E/A: 0.54
BH CV ECHO MEAS - MV P1/2T MAX VEL: 84.3 CM/SEC
BH CV ECHO MEAS - MV P1/2T: 86.3 MSEC
BH CV ECHO MEAS - MVA P1/2T LCG: 2.6 CM^2
BH CV ECHO MEAS - MVA(P1/2T): 2.5 CM^2
BH CV ECHO MEAS - PA ACC SLOPE: 875.3 CM/SEC^2
BH CV ECHO MEAS - PA ACC TIME: 0.1 SEC
BH CV ECHO MEAS - PA MAX PG: 3.5 MMHG
BH CV ECHO MEAS - PA PR(ACCEL): 33.8 MMHG
BH CV ECHO MEAS - PA V2 MAX: 93.3 CM/SEC
BH CV ECHO MEAS - PI END-D VEL: 65.6 CM/SEC
BH CV ECHO MEAS - RAP SYSTOLE: 3 MMHG
BH CV ECHO MEAS - RVSP: 37 MMHG
BH CV ECHO MEAS - SI(AO): 117.8 ML/M^2
BH CV ECHO MEAS - SI(CUBED): 46.6 ML/M^2
BH CV ECHO MEAS - SI(LVOT): 45.6 ML/M^2
BH CV ECHO MEAS - SI(MOD-SP2): 25.1 ML/M^2
BH CV ECHO MEAS - SI(MOD-SP4): 37.7 ML/M^2
BH CV ECHO MEAS - SI(TEICH): 40.6 ML/M^2
BH CV ECHO MEAS - SV(AO): 187.5 ML
BH CV ECHO MEAS - SV(CUBED): 74.1 ML
BH CV ECHO MEAS - SV(LVOT): 72.6 ML
BH CV ECHO MEAS - SV(MOD-SP2): 40 ML
BH CV ECHO MEAS - SV(MOD-SP4): 60 ML
BH CV ECHO MEAS - SV(TEICH): 64.6 ML
BH CV ECHO MEAS - TAPSE (>1.6): 2.2 CM
BH CV ECHO MEAS - TR MAX PG: 34 MMHG
BH CV ECHO MEAS - TR MAX VEL: 277.1 CM/SEC
BH CV ECHO MEASUREMENTS AVERAGE E/E' RATIO: 5.69
BH CV XLRA - RV BASE: 3.7 CM
BH CV XLRA - RV LENGTH: 7.6 CM
BH CV XLRA - RV MID: 2.9 CM
BH CV XLRA - TDI S': 18.4 CM/SEC
LEFT ATRIUM VOLUME INDEX: 13.2 ML/M2
LV EF 2D ECHO EST: 55 %
MAXIMAL PREDICTED HEART RATE: 140 BPM
STRESS TARGET HR: 119 BPM

## 2021-05-31 ENCOUNTER — APPOINTMENT (OUTPATIENT)
Dept: PREADMISSION TESTING | Facility: HOSPITAL | Age: 81
End: 2021-05-31

## 2021-05-31 LAB — SARS-COV-2 RNA PNL SPEC NAA+PROBE: NOT DETECTED

## 2021-05-31 PROCEDURE — U0004 COV-19 TEST NON-CDC HGH THRU: HCPCS

## 2021-05-31 PROCEDURE — C9803 HOPD COVID-19 SPEC COLLECT: HCPCS

## 2021-06-01 ENCOUNTER — TELEPHONE (OUTPATIENT)
Dept: CARDIOLOGY | Facility: CLINIC | Age: 81
End: 2021-06-01

## 2021-06-01 RX ORDER — FAMOTIDINE 10 MG/ML
20 INJECTION, SOLUTION INTRAVENOUS ONCE
Status: CANCELLED | OUTPATIENT
Start: 2021-06-01 | End: 2021-06-01

## 2021-06-01 NOTE — TELEPHONE ENCOUNTER
I received a call from Aishwarya with [facility] (p:[phone]) They are in need of the patients echo to be reviewed and cardiac clearance for the patients upcoming surgery tomorrow morning. We need to fax back a note to [phone])

## 2021-06-02 ENCOUNTER — HOSPITAL ENCOUNTER (OUTPATIENT)
Facility: HOSPITAL | Age: 81
Discharge: HOME | End: 2021-06-03
Attending: OBSTETRICS & GYNECOLOGY | Admitting: OBSTETRICS & GYNECOLOGY

## 2021-06-02 ENCOUNTER — ANESTHESIA (OUTPATIENT)
Dept: PERIOP | Facility: HOSPITAL | Age: 81
End: 2021-06-02

## 2021-06-02 DIAGNOSIS — N81.10 PELVIC RELAXATION DUE TO VAGINAL PROLAPSE: Primary | ICD-10-CM

## 2021-06-02 PROBLEM — N81.6 CYSTOCELE WITH RECTOCELE: Status: RESOLVED | Noted: 2020-09-28 | Resolved: 2021-06-02

## 2021-06-02 PROCEDURE — 25010000002 DEXAMETHASONE PER 1 MG: Performed by: NURSE ANESTHETIST, CERTIFIED REGISTERED

## 2021-06-02 PROCEDURE — 25010000002 KETOROLAC TROMETHAMINE PER 15 MG: Performed by: OBSTETRICS & GYNECOLOGY

## 2021-06-02 PROCEDURE — A9270 NON-COVERED ITEM OR SERVICE: HCPCS | Performed by: OBSTETRICS & GYNECOLOGY

## 2021-06-02 PROCEDURE — 25010000002 HEPARIN (PORCINE) PER 1000 UNITS: Performed by: OBSTETRICS & GYNECOLOGY

## 2021-06-02 PROCEDURE — 25010000002 ONDANSETRON PER 1 MG: Performed by: NURSE ANESTHETIST, CERTIFIED REGISTERED

## 2021-06-02 PROCEDURE — 63710000001: Performed by: OBSTETRICS & GYNECOLOGY

## 2021-06-02 PROCEDURE — 63710000001 LATANOPROST 0.005 % SOLUTION 2.5 ML BOTTLE: Performed by: OBSTETRICS & GYNECOLOGY

## 2021-06-02 PROCEDURE — 25010000003 CEFAZOLIN IN DEXTROSE 2-4 GM/100ML-% SOLUTION: Performed by: OBSTETRICS & GYNECOLOGY

## 2021-06-02 PROCEDURE — 63710000001 LISINOPRIL 10 MG TABLET 100 EACH BOX: Performed by: OBSTETRICS & GYNECOLOGY

## 2021-06-02 PROCEDURE — 63710000001 FOLIC ACID 1 MG TABLET: Performed by: OBSTETRICS & GYNECOLOGY

## 2021-06-02 PROCEDURE — 63710000001 AMLODIPINE 5 MG TABLET 100 EACH BOX: Performed by: OBSTETRICS & GYNECOLOGY

## 2021-06-02 PROCEDURE — 25010000002 FENTANYL CITRATE (PF) 50 MCG/ML SOLUTION: Performed by: NURSE ANESTHETIST, CERTIFIED REGISTERED

## 2021-06-02 PROCEDURE — 94799 UNLISTED PULMONARY SVC/PX: CPT

## 2021-06-02 PROCEDURE — 25010000002 PROPOFOL 10 MG/ML EMULSION: Performed by: NURSE ANESTHETIST, CERTIFIED REGISTERED

## 2021-06-02 PROCEDURE — 25010000002 SUCCINYLCHOLINE PER 20 MG: Performed by: NURSE ANESTHETIST, CERTIFIED REGISTERED

## 2021-06-02 RX ORDER — FENTANYL CITRATE 50 UG/ML
25 INJECTION, SOLUTION INTRAMUSCULAR; INTRAVENOUS
Status: DISCONTINUED | OUTPATIENT
Start: 2021-06-02 | End: 2021-06-02 | Stop reason: HOSPADM

## 2021-06-02 RX ORDER — IPRATROPIUM BROMIDE AND ALBUTEROL SULFATE 2.5; .5 MG/3ML; MG/3ML
3 SOLUTION RESPIRATORY (INHALATION) ONCE AS NEEDED
Status: DISCONTINUED | OUTPATIENT
Start: 2021-06-02 | End: 2021-06-02 | Stop reason: HOSPADM

## 2021-06-02 RX ORDER — SODIUM CHLORIDE, SODIUM LACTATE, POTASSIUM CHLORIDE, CALCIUM CHLORIDE 600; 310; 30; 20 MG/100ML; MG/100ML; MG/100ML; MG/100ML
100 INJECTION, SOLUTION INTRAVENOUS CONTINUOUS
Status: DISCONTINUED | OUTPATIENT
Start: 2021-06-02 | End: 2021-06-03 | Stop reason: HOSPADM

## 2021-06-02 RX ORDER — PROMETHAZINE HYDROCHLORIDE 25 MG/1
25 SUPPOSITORY RECTAL ONCE AS NEEDED
Status: DISCONTINUED | OUTPATIENT
Start: 2021-06-02 | End: 2021-06-02 | Stop reason: HOSPADM

## 2021-06-02 RX ORDER — HYDROCODONE BITARTRATE AND ACETAMINOPHEN 5; 325 MG/1; MG/1
1 TABLET ORAL EVERY 4 HOURS PRN
Status: DISCONTINUED | OUTPATIENT
Start: 2021-06-02 | End: 2021-06-03 | Stop reason: HOSPADM

## 2021-06-02 RX ORDER — DROPERIDOL 2.5 MG/ML
0.62 INJECTION, SOLUTION INTRAMUSCULAR; INTRAVENOUS ONCE AS NEEDED
Status: DISCONTINUED | OUTPATIENT
Start: 2021-06-02 | End: 2021-06-02 | Stop reason: HOSPADM

## 2021-06-02 RX ORDER — METRONIDAZOLE 7.5 MG/G
1 GEL VAGINAL ONCE
COMMUNITY

## 2021-06-02 RX ORDER — ONDANSETRON 4 MG/1
4 TABLET, FILM COATED ORAL EVERY 6 HOURS PRN
Status: DISCONTINUED | OUTPATIENT
Start: 2021-06-02 | End: 2021-06-03 | Stop reason: HOSPADM

## 2021-06-02 RX ORDER — LIDOCAINE HYDROCHLORIDE 10 MG/ML
INJECTION, SOLUTION EPIDURAL; INFILTRATION; INTRACAUDAL; PERINEURAL AS NEEDED
Status: DISCONTINUED | OUTPATIENT
Start: 2021-06-02 | End: 2021-06-02 | Stop reason: SURG

## 2021-06-02 RX ORDER — FENTANYL CITRATE 50 UG/ML
INJECTION, SOLUTION INTRAMUSCULAR; INTRAVENOUS AS NEEDED
Status: DISCONTINUED | OUTPATIENT
Start: 2021-06-02 | End: 2021-06-02 | Stop reason: SURG

## 2021-06-02 RX ORDER — SODIUM CHLORIDE 0.9 % (FLUSH) 0.9 %
3 SYRINGE (ML) INJECTION EVERY 12 HOURS SCHEDULED
Status: DISCONTINUED | OUTPATIENT
Start: 2021-06-02 | End: 2021-06-02 | Stop reason: HOSPADM

## 2021-06-02 RX ORDER — LATANOPROST 50 UG/ML
1 SOLUTION/ DROPS OPHTHALMIC NIGHTLY
Status: DISCONTINUED | OUTPATIENT
Start: 2021-06-02 | End: 2021-06-03 | Stop reason: HOSPADM

## 2021-06-02 RX ORDER — ACETAMINOPHEN 500 MG
1000 TABLET ORAL ONCE
Status: COMPLETED | OUTPATIENT
Start: 2021-06-02 | End: 2021-06-02

## 2021-06-02 RX ORDER — POLYETHYLENE GLYCOL 3350 17 G/17G
17 POWDER, FOR SOLUTION ORAL DAILY PRN
Status: DISCONTINUED | OUTPATIENT
Start: 2021-06-02 | End: 2021-06-03 | Stop reason: HOSPADM

## 2021-06-02 RX ORDER — ONDANSETRON 2 MG/ML
4 INJECTION INTRAMUSCULAR; INTRAVENOUS EVERY 6 HOURS PRN
Status: DISCONTINUED | OUTPATIENT
Start: 2021-06-02 | End: 2021-06-03 | Stop reason: HOSPADM

## 2021-06-02 RX ORDER — CEFAZOLIN SODIUM 2 G/100ML
2 INJECTION, SOLUTION INTRAVENOUS EVERY 8 HOURS
Status: COMPLETED | OUTPATIENT
Start: 2021-06-02 | End: 2021-06-03

## 2021-06-02 RX ORDER — PROMETHAZINE HYDROCHLORIDE 25 MG/1
25 TABLET ORAL ONCE AS NEEDED
Status: DISCONTINUED | OUTPATIENT
Start: 2021-06-02 | End: 2021-06-02 | Stop reason: HOSPADM

## 2021-06-02 RX ORDER — SODIUM CHLORIDE 0.9 % (FLUSH) 0.9 %
10 SYRINGE (ML) INJECTION EVERY 12 HOURS SCHEDULED
Status: DISCONTINUED | OUTPATIENT
Start: 2021-06-02 | End: 2021-06-02 | Stop reason: HOSPADM

## 2021-06-02 RX ORDER — SODIUM CHLORIDE 0.9 % (FLUSH) 0.9 %
3-10 SYRINGE (ML) INJECTION AS NEEDED
Status: DISCONTINUED | OUTPATIENT
Start: 2021-06-02 | End: 2021-06-02 | Stop reason: HOSPADM

## 2021-06-02 RX ORDER — ACETAMINOPHEN 650 MG/1
650 SUPPOSITORY RECTAL EVERY 4 HOURS PRN
Status: DISCONTINUED | OUTPATIENT
Start: 2021-06-02 | End: 2021-06-03 | Stop reason: HOSPADM

## 2021-06-02 RX ORDER — MEPERIDINE HYDROCHLORIDE 25 MG/ML
12.5 INJECTION INTRAMUSCULAR; INTRAVENOUS; SUBCUTANEOUS
Status: DISCONTINUED | OUTPATIENT
Start: 2021-06-02 | End: 2021-06-02 | Stop reason: HOSPADM

## 2021-06-02 RX ORDER — MAGNESIUM HYDROXIDE 1200 MG/15ML
LIQUID ORAL AS NEEDED
Status: DISCONTINUED | OUTPATIENT
Start: 2021-06-02 | End: 2021-06-02 | Stop reason: HOSPADM

## 2021-06-02 RX ORDER — LIDOCAINE HYDROCHLORIDE 10 MG/ML
0.5 INJECTION, SOLUTION EPIDURAL; INFILTRATION; INTRACAUDAL; PERINEURAL ONCE AS NEEDED
Status: COMPLETED | OUTPATIENT
Start: 2021-06-02 | End: 2021-06-02

## 2021-06-02 RX ORDER — ROCURONIUM BROMIDE 10 MG/ML
INJECTION, SOLUTION INTRAVENOUS AS NEEDED
Status: DISCONTINUED | OUTPATIENT
Start: 2021-06-02 | End: 2021-06-02 | Stop reason: SURG

## 2021-06-02 RX ORDER — TEMAZEPAM 7.5 MG/1
7.5 CAPSULE ORAL NIGHTLY PRN
Status: DISCONTINUED | OUTPATIENT
Start: 2021-06-02 | End: 2021-06-03 | Stop reason: HOSPADM

## 2021-06-02 RX ORDER — BRIMONIDINE TARTRATE AND TIMOLOL MALEATE 2; 5 MG/ML; MG/ML
1 SOLUTION OPHTHALMIC EVERY 12 HOURS SCHEDULED
Status: DISCONTINUED | OUTPATIENT
Start: 2021-06-02 | End: 2021-06-03 | Stop reason: HOSPADM

## 2021-06-02 RX ORDER — HEPARIN SODIUM 5000 [USP'U]/ML
INJECTION, SOLUTION INTRAVENOUS; SUBCUTANEOUS AS NEEDED
Status: DISCONTINUED | OUTPATIENT
Start: 2021-06-02 | End: 2021-06-02 | Stop reason: HOSPADM

## 2021-06-02 RX ORDER — ACETAMINOPHEN 160 MG/5ML
650 SOLUTION ORAL EVERY 4 HOURS PRN
Status: DISCONTINUED | OUTPATIENT
Start: 2021-06-02 | End: 2021-06-03 | Stop reason: HOSPADM

## 2021-06-02 RX ORDER — EPHEDRINE SULFATE 50 MG/ML
INJECTION, SOLUTION INTRAVENOUS AS NEEDED
Status: DISCONTINUED | OUTPATIENT
Start: 2021-06-02 | End: 2021-06-02 | Stop reason: SURG

## 2021-06-02 RX ORDER — HEPARIN SODIUM 5000 [USP'U]/ML
5000 INJECTION, SOLUTION INTRAVENOUS; SUBCUTANEOUS ONCE
Status: DISCONTINUED | OUTPATIENT
Start: 2021-06-02 | End: 2021-06-02 | Stop reason: HOSPADM

## 2021-06-02 RX ORDER — FAMOTIDINE 20 MG/1
20 TABLET, FILM COATED ORAL ONCE
Status: COMPLETED | OUTPATIENT
Start: 2021-06-02 | End: 2021-06-02

## 2021-06-02 RX ORDER — SODIUM CHLORIDE, SODIUM LACTATE, POTASSIUM CHLORIDE, CALCIUM CHLORIDE 600; 310; 30; 20 MG/100ML; MG/100ML; MG/100ML; MG/100ML
9 INJECTION, SOLUTION INTRAVENOUS CONTINUOUS
Status: DISCONTINUED | OUTPATIENT
Start: 2021-06-02 | End: 2021-06-03 | Stop reason: HOSPADM

## 2021-06-02 RX ORDER — HYDRALAZINE HYDROCHLORIDE 20 MG/ML
5 INJECTION INTRAMUSCULAR; INTRAVENOUS
Status: DISCONTINUED | OUTPATIENT
Start: 2021-06-02 | End: 2021-06-02 | Stop reason: HOSPADM

## 2021-06-02 RX ORDER — ONDANSETRON 2 MG/ML
INJECTION INTRAMUSCULAR; INTRAVENOUS AS NEEDED
Status: DISCONTINUED | OUTPATIENT
Start: 2021-06-02 | End: 2021-06-02 | Stop reason: SURG

## 2021-06-02 RX ORDER — HYDROMORPHONE HYDROCHLORIDE 1 MG/ML
0.25 INJECTION, SOLUTION INTRAMUSCULAR; INTRAVENOUS; SUBCUTANEOUS
Status: DISCONTINUED | OUTPATIENT
Start: 2021-06-02 | End: 2021-06-02 | Stop reason: HOSPADM

## 2021-06-02 RX ORDER — DROPERIDOL 2.5 MG/ML
0.62 INJECTION, SOLUTION INTRAMUSCULAR; INTRAVENOUS AS NEEDED
Status: DISCONTINUED | OUTPATIENT
Start: 2021-06-02 | End: 2021-06-02 | Stop reason: HOSPADM

## 2021-06-02 RX ORDER — ACETAMINOPHEN 325 MG/1
650 TABLET ORAL EVERY 4 HOURS PRN
Status: DISCONTINUED | OUTPATIENT
Start: 2021-06-02 | End: 2021-06-03 | Stop reason: HOSPADM

## 2021-06-02 RX ORDER — NALOXONE HCL 0.4 MG/ML
0.4 VIAL (ML) INJECTION
Status: DISCONTINUED | OUTPATIENT
Start: 2021-06-02 | End: 2021-06-03 | Stop reason: HOSPADM

## 2021-06-02 RX ORDER — MORPHINE SULFATE 2 MG/ML
1 INJECTION, SOLUTION INTRAMUSCULAR; INTRAVENOUS EVERY 4 HOURS PRN
Status: DISCONTINUED | OUTPATIENT
Start: 2021-06-02 | End: 2021-06-03 | Stop reason: HOSPADM

## 2021-06-02 RX ORDER — DEXAMETHASONE SODIUM PHOSPHATE 4 MG/ML
INJECTION, SOLUTION INTRA-ARTICULAR; INTRALESIONAL; INTRAMUSCULAR; INTRAVENOUS; SOFT TISSUE AS NEEDED
Status: DISCONTINUED | OUTPATIENT
Start: 2021-06-02 | End: 2021-06-02 | Stop reason: SURG

## 2021-06-02 RX ORDER — CEFAZOLIN SODIUM 2 G/100ML
2 INJECTION, SOLUTION INTRAVENOUS ONCE
Status: COMPLETED | OUTPATIENT
Start: 2021-06-02 | End: 2021-06-02

## 2021-06-02 RX ORDER — SODIUM CHLORIDE 0.9 % (FLUSH) 0.9 %
10 SYRINGE (ML) INJECTION AS NEEDED
Status: DISCONTINUED | OUTPATIENT
Start: 2021-06-02 | End: 2021-06-02 | Stop reason: HOSPADM

## 2021-06-02 RX ORDER — ONDANSETRON 2 MG/ML
4 INJECTION INTRAMUSCULAR; INTRAVENOUS ONCE AS NEEDED
Status: DISCONTINUED | OUTPATIENT
Start: 2021-06-02 | End: 2021-06-02 | Stop reason: HOSPADM

## 2021-06-02 RX ORDER — MIDAZOLAM HYDROCHLORIDE 1 MG/ML
0.5 INJECTION INTRAMUSCULAR; INTRAVENOUS
Status: DISCONTINUED | OUTPATIENT
Start: 2021-06-02 | End: 2021-06-02 | Stop reason: HOSPADM

## 2021-06-02 RX ORDER — SUCCINYLCHOLINE CHLORIDE 20 MG/ML
INJECTION INTRAMUSCULAR; INTRAVENOUS AS NEEDED
Status: DISCONTINUED | OUTPATIENT
Start: 2021-06-02 | End: 2021-06-02 | Stop reason: SURG

## 2021-06-02 RX ORDER — HEPARIN SODIUM 5000 [USP'U]/ML
5000 INJECTION, SOLUTION INTRAVENOUS; SUBCUTANEOUS EVERY 12 HOURS SCHEDULED
Status: DISCONTINUED | OUTPATIENT
Start: 2021-06-03 | End: 2021-06-03 | Stop reason: HOSPADM

## 2021-06-02 RX ORDER — LABETALOL HYDROCHLORIDE 5 MG/ML
5 INJECTION, SOLUTION INTRAVENOUS
Status: DISCONTINUED | OUTPATIENT
Start: 2021-06-02 | End: 2021-06-02 | Stop reason: HOSPADM

## 2021-06-02 RX ORDER — FOLIC ACID 1 MG/1
1 TABLET ORAL NIGHTLY
Status: DISCONTINUED | OUTPATIENT
Start: 2021-06-02 | End: 2021-06-03 | Stop reason: HOSPADM

## 2021-06-02 RX ORDER — NALOXONE HCL 0.4 MG/ML
0.4 VIAL (ML) INJECTION AS NEEDED
Status: DISCONTINUED | OUTPATIENT
Start: 2021-06-02 | End: 2021-06-02 | Stop reason: HOSPADM

## 2021-06-02 RX ORDER — PROPOFOL 10 MG/ML
VIAL (ML) INTRAVENOUS AS NEEDED
Status: DISCONTINUED | OUTPATIENT
Start: 2021-06-02 | End: 2021-06-02 | Stop reason: SURG

## 2021-06-02 RX ORDER — KETOROLAC TROMETHAMINE 15 MG/ML
15 INJECTION, SOLUTION INTRAMUSCULAR; INTRAVENOUS EVERY 6 HOURS
Status: DISPENSED | OUTPATIENT
Start: 2021-06-02 | End: 2021-06-03

## 2021-06-02 RX ORDER — SODIUM CHLORIDE 9 MG/ML
INJECTION, SOLUTION INTRAVENOUS CONTINUOUS PRN
Status: DISCONTINUED | OUTPATIENT
Start: 2021-06-02 | End: 2021-06-02

## 2021-06-02 RX ADMIN — BRIMONIDINE TARTRATE, TIMOLOL MALEATE 1 DROP: 2; 5 SOLUTION/ DROPS OPHTHALMIC at 21:15

## 2021-06-02 RX ADMIN — PROPOFOL 50 MG: 10 INJECTION, EMULSION INTRAVENOUS at 13:27

## 2021-06-02 RX ADMIN — SODIUM CHLORIDE, POTASSIUM CHLORIDE, SODIUM LACTATE AND CALCIUM CHLORIDE 9 ML/HR: 600; 310; 30; 20 INJECTION, SOLUTION INTRAVENOUS at 11:00

## 2021-06-02 RX ADMIN — PROPOFOL 200 MG: 10 INJECTION, EMULSION INTRAVENOUS at 13:04

## 2021-06-02 RX ADMIN — SUCCINYLCHOLINE CHLORIDE 160 MG: 20 INJECTION, SOLUTION INTRAMUSCULAR; INTRAVENOUS at 13:07

## 2021-06-02 RX ADMIN — EPHEDRINE SULFATE 10 MG: 50 INJECTION INTRAVENOUS at 13:35

## 2021-06-02 RX ADMIN — FOLIC ACID 1 MG: 1 TABLET ORAL at 21:14

## 2021-06-02 RX ADMIN — LISINOPRIL: 10 TABLET ORAL at 18:39

## 2021-06-02 RX ADMIN — EPHEDRINE SULFATE 10 MG: 50 INJECTION INTRAVENOUS at 13:59

## 2021-06-02 RX ADMIN — FENTANYL CITRATE 50 MCG: 50 INJECTION, SOLUTION INTRAMUSCULAR; INTRAVENOUS at 13:27

## 2021-06-02 RX ADMIN — CEFAZOLIN SODIUM 2 G: 10 INJECTION, POWDER, FOR SOLUTION INTRAVENOUS at 12:59

## 2021-06-02 RX ADMIN — LIDOCAINE HYDROCHLORIDE 0.5 ML: 10 INJECTION, SOLUTION EPIDURAL; INFILTRATION; INTRACAUDAL; PERINEURAL at 11:01

## 2021-06-02 RX ADMIN — KETOROLAC TROMETHAMINE 15 MG: 15 INJECTION, SOLUTION INTRAMUSCULAR; INTRAVENOUS at 21:14

## 2021-06-02 RX ADMIN — SODIUM CHLORIDE, POTASSIUM CHLORIDE, SODIUM LACTATE AND CALCIUM CHLORIDE 100 ML/HR: 600; 310; 30; 20 INJECTION, SOLUTION INTRAVENOUS at 18:18

## 2021-06-02 RX ADMIN — LIDOCAINE HYDROCHLORIDE 50 MG: 10 INJECTION, SOLUTION EPIDURAL; INFILTRATION; INTRACAUDAL; PERINEURAL at 13:04

## 2021-06-02 RX ADMIN — ONDANSETRON 4 MG: 2 INJECTION INTRAMUSCULAR; INTRAVENOUS at 14:30

## 2021-06-02 RX ADMIN — FAMOTIDINE 20 MG: 20 TABLET ORAL at 11:01

## 2021-06-02 RX ADMIN — ROCURONIUM BROMIDE 10 MG: 10 INJECTION, SOLUTION INTRAVENOUS at 13:07

## 2021-06-02 RX ADMIN — FENTANYL CITRATE 50 MCG: 50 INJECTION, SOLUTION INTRAMUSCULAR; INTRAVENOUS at 14:00

## 2021-06-02 RX ADMIN — ACETAMINOPHEN 1000 MG: 500 TABLET ORAL at 11:01

## 2021-06-02 RX ADMIN — SODIUM CHLORIDE, POTASSIUM CHLORIDE, SODIUM LACTATE AND CALCIUM CHLORIDE: 600; 310; 30; 20 INJECTION, SOLUTION INTRAVENOUS at 14:01

## 2021-06-02 RX ADMIN — DEXAMETHASONE SODIUM PHOSPHATE 8 MG: 4 INJECTION, SOLUTION INTRA-ARTICULAR; INTRALESIONAL; INTRAMUSCULAR; INTRAVENOUS; SOFT TISSUE at 13:12

## 2021-06-02 RX ADMIN — LATANOPROST 1 DROP: 50 SOLUTION OPHTHALMIC at 21:30

## 2021-06-02 RX ADMIN — CEFAZOLIN SODIUM 2 G: 2 INJECTION, SOLUTION INTRAVENOUS at 21:14

## 2021-06-02 NOTE — ANESTHESIA PROCEDURE NOTES
Airway  Urgency: elective    Date/Time: 6/2/2021 1:07 PM  Airway not difficult    General Information and Staff    Patient location during procedure: OR  CRNA: Belem Jarrell CRNA    Indications and Patient Condition  Indications for airway management: airway protection    Preoxygenated: yes  MILS not maintained throughout  Mask difficulty assessment: 2 - vent by mask + OA or adjuvant +/- NMBA    Final Airway Details  Final airway type: endotracheal airway      Successful airway: ETT  Cuffed: yes   Successful intubation technique: video laryngoscopy (elective gottlieb intubation)  Facilitating devices/methods: intubating stylet  Endotracheal tube insertion site: oral  Blade: Gottlieb  Blade size: 3  ETT size (mm): 7.0  Cormack-Lehane Classification: grade I - full view of glottis  Placement verified by: chest auscultation and capnometry   Measured from: lips  ETT/EBT  to lips (cm): 22  Number of attempts at approach: 1  Assessment: lips, teeth, and gum same as pre-op and atraumatic intubation    Additional Comments  Negative epigastric sounds, Breath sound equal bilaterally with symmetric chest rise and fall

## 2021-06-02 NOTE — OP NOTE
GYN Operative Note    Patient Name, age and sex:  Kelle Edwards, 81 y.o., female  Procedure Date:  6/2/2021    Surgeon(s) and Role:     * Estela Chase MD - Primary    Additional Staff:  Keya Drummond  Medically necessary for assistance.Complex retraction. Suturing skills.Complex and critical patient postioning.  Complex vaginal surgery assistance.     * No Diagnosis Codes entered *    * No Diagnosis Codes entered *    Procedure(s):  ANTERIOR AND POSTERIOR COLPORRHAPY WITH ENTROCELE perineorrhaphy, cystoscopy    Indications for Operation: complete cystocele/ rectocele wide genital hiatus.  Large abdominal prior incision , avoiding abdominal surgery, opting for vaginal repair.     Antibiotics:  cefazolin (Ancef) ordered on call to OR    Anesthesia:  Type: General -   ASA:  III    Operative Findings:  4th degree cystocele, 3rd degree rectocele. enterocele    Specimens Removed:  None    Estimated Blood Loss:  100 mL    Urine Output:  230 mL    Complications: none    Procedure Narrative:  Pt take to operating room  General anesthesia . Exam under anesthesia . Placed in lithotomy position Gan catheter placed.  The prolapse was then evaluated the most dependent part of the prolapse which was the anterior vaginal wall was grasped with Allis clamps.  This was injected with the cord percent Marcaine with local solution and injectable saline to hydrodissect.  The scalpel was then used to incise the vaginal mucosa in the midline.  This was then dissected anteriorly and posterior along the length of the prolapsed cystocele.  The pubocervical fascia was then dissected off bilaterally.  The interrupted 0 Vicryl suture was then used to reapproximate the pubocervical fascia in the midline repairing any defects.  The excellent support was noted.  The excess vaginal mucosa was trimmed and this was then closed with a 2-0 running locking Vicryl suture.  The perineum was then inspected grasped with the Allis clamps.  A  perineorrhaphy was to be performed therefore a ruddy-shaped wedge excision of the perineal skin was then excised.  The midline vaginal mucosa was then undermined dissected along the length of the rectocele.  The rectovaginal fascia was then dissected off as well as any peritoneal skin from the enterocele.  The enterocele sac was then closed with an pursestring suture of 0 Vicryl high and posterior.  The rectovaginal fascia was then reapproximated with 2 layers of 0 Vicryl suture.  The perineum was reconstructed with interrupted 0 Vicryl sutures making sure to lengthen and strengthen the perineal body and narrow the vaginal opening.  The excess vaginal mucosa was trimmed and this mucosa was closed with a 3-0 Vicryl with a running locking fashion.  The perineum skin was reconstructed with interrupted subcuticular 3-0 Vicryl as well.  The Gan was then removed.  The cystoscopy was performed.  The bladder was filled with 300 cc sterile saline.  The bladder was intact.  The bilateral ureteral orifices was identified noted to have clear urine flowing through those.  No obstruction was noted.  The Gan catheter was replaced.  The vagina was inspected for hemostasis and no active bleeding was noted.  All sutures intact.  All instruments were removed.  A final count was correct.  She is awoke from general anesthesia and taken to recovery in stable condition.    Estela Chase MD - 6/2/2021, 14:49 EDT

## 2021-06-02 NOTE — H&P
Patient Care Team:  Ed Jung MD as PCP - General (Adolescent Medicine)    Chief complaint vaginal prolapse.    Subjective     Patient is a 81 y.o. female presents with significant vaginal prolapse.  She has had a prior large midline abdominal incision but we had decided to proceed with a vaginal repair of prolapse partial colpocleisis and anterior posterior repair.    Review of Systems   Pertinent items are noted in HPI    History  Past Medical History:   Diagnosis Date   • Diverticulosis    • History of transfusion 1959    NO REACTION   • Hypertension    • Insomnia    • Rheumatoid arthritis (CMS/HCC)      Past Surgical History:   Procedure Laterality Date   • BREAST BIOPSY     • COLONOSCOPY  2018   • CYSTOSCOPY      Dr Mathews   • KNEE SURGERY      broken leg - has plates and screws   • OOPHORECTOMY     • SPLENECTOMY     • VAGINAL HYSTERECTOMY SALPINGO OOPHORECTOMY      late 90's or early 2000's     Family History   Problem Relation Age of Onset   • Heart disease Father    • Heart disease Brother    • Breast cancer Neg Hx    • Ovarian cancer Neg Hx      Social History     Tobacco Use   • Smoking status: Never Smoker   • Smokeless tobacco: Never Used   Vaping Use   • Vaping Use: Never used   Substance Use Topics   • Alcohol use: Not Currently   • Drug use: Never     E-cigarette/Vaping   • E-cigarette/Vaping Use Never User    • Passive Exposure No      E-cigarette/Vaping Substances     Medications Prior to Admission   Medication Sig Dispense Refill Last Dose   • amLODIPine-benazepril (LOTREL 5-20) 5-20 MG per capsule Take 1 capsule by mouth Daily.   6/1/2021 at 2100   • brimonidine-timolol (Combigan) 0.2-0.5 % ophthalmic solution Administer 1 drop to both eyes Every 12 (Twelve) Hours.   6/1/2021 at 2100   • CALCIUM PO Take 1 dose by mouth Daily.   Past Week at Unknown time   • conjugated estrogens (Premarin) 0.625 MG/GM vaginal cream Insert 0.5 g into the vagina Daily.   Past Month at Unknown time   • folic  acid (FOLVITE) 1 MG tablet Take 1 mg by mouth Daily.   6/1/2021 at 2100   • latanoprost (XALATAN) 0.005 % ophthalmic solution Administer 1 drop to both eyes Daily.   6/1/2021 at 2100   • Multiple Vitamins-Minerals (CENTRUM SILVER ULTRA WOMENS PO) Take 1 tablet by mouth Daily.   Past Week at Unknown time   • methotrexate 2.5 MG tablet Take 12.5 mg by mouth 1 (One) Time Per Week.   5/19/2021   • metroNIDAZOLE (METROGEL) 0.75 % vaginal gel Insert 1 application into the vagina 1 (One) Time. AT BEDTIME   More than a month at Unknown time     Allergies:  No Known Allergies    Objective     Vital Signs  Temp:  [98.2 °F (36.8 °C)] 98.2 °F (36.8 °C)  Heart Rate:  [81] 81  Resp:  [18] 18  BP: (160)/(80) 160/80    Physical Exam:      General Appearance:    Alert, cooperative, in no acute distress   Head:    Normocephalic, without obvious abnormality, atraumatic   Eyes:            Lids and lashes normal, conjunctivae and sclerae normal, no   icterus, no pallor, corneas clear, PERRLA   Ears:    Ears appear intact with no abnormalities noted   Throat:   No oral lesions, no thrush, oral mucosa moist   Neck:   No adenopathy, supple, trachea midline, no thyromegaly, no     carotid bruit, no JVD   Back:     No kyphosis present, no scoliosis present, no skin lesions,       erythema or scars, no tenderness to percussion or                   palpation,   range of motion normal   Lungs:     Clear to auscultation,respirations regular, even and                   unlabored    Heart:    Regular rhythm and normal rate, normal S1 and S2, no            murmur, no gallop, no rub, no click   Breast Exam:    Deferred   Abdomen:     Normal bowel sounds, no masses, no organomegaly, soft        non-tender, non-distended, no guarding, no rebound                 tenderness   Genitalia:    Deferred   Extremities:   Moves all extremities well, no edema, no cyanosis, no              redness   Pulses:   Pulses palpable and equal bilaterally   Skin:   No  bleeding, bruising or rash   Lymph nodes:   No palpable adenopathy   Neurologic:   Cranial nerves 2 - 12 grossly intact, sensation intact, DTR        present and equal bilaterally       Results Review:    I reviewed the patient's new clinical results.    Assessment/Plan       * No active hospital problems. *      Patient had cardiac clearance and cardiology says she is good to go.  Low risk for cardiac issues.  We will admit her overnight with a Gan catheter.  Do voiding trials tomorrow and possible discharge tomorrow.

## 2021-06-02 NOTE — ANESTHESIA POSTPROCEDURE EVALUATION
Patient: Kelle Edwards    Procedure Summary     Date: 06/02/21 [facility]    Anesthesia Start: 1257 Anesthesia Stop: 1453    Procedure: ANTERIOR AND POSTERIOR COLPORRHAPY WITH ENTROCELE (N/A Vagina) Diagnosis:     Surgeons: Estela Chase MD Provider: Beau Vega MD    Anesthesia Type: general ASA Status: 3          Anesthesia Type: general    Vitals  Vitals Value Taken Time   /68 06/02/21 1450   Temp     Pulse 84 06/02/21 1452   Resp     SpO2     Vitals shown include unvalidated device data.    Temp 97.4F  RR 14  SpO2 95%    Post Anesthesia Care and Evaluation    Patient location during evaluation: PACU  Patient participation: complete - patient participated  Level of consciousness: sleepy but conscious  Pain management: adequate  Airway patency: patent  Anesthetic complications: No anesthetic complications  PONV Status: none  Cardiovascular status: hemodynamically stable and acceptable  Respiratory status: nonlabored ventilation, acceptable and nasal cannula  Hydration status: acceptable

## 2021-06-03 VITALS
DIASTOLIC BLOOD PRESSURE: 72 MMHG | HEART RATE: 51 BPM | WEIGHT: 130 LBS | BODY MASS INDEX: 23.92 KG/M2 | RESPIRATION RATE: 18 BRPM | SYSTOLIC BLOOD PRESSURE: 137 MMHG | TEMPERATURE: 98.1 F | OXYGEN SATURATION: 93 % | HEIGHT: 62 IN

## 2021-06-03 PROBLEM — N81.10 PELVIC RELAXATION DUE TO VAGINAL PROLAPSE: Status: RESOLVED | Noted: 2021-06-02 | Resolved: 2021-06-03

## 2021-06-03 PROCEDURE — A9270 NON-COVERED ITEM OR SERVICE: HCPCS | Performed by: OBSTETRICS & GYNECOLOGY

## 2021-06-03 PROCEDURE — 63710000001 LISINOPRIL 10 MG TABLET 100 EACH BOX: Performed by: OBSTETRICS & GYNECOLOGY

## 2021-06-03 PROCEDURE — 25010000003 CEFAZOLIN IN DEXTROSE 2-4 GM/100ML-% SOLUTION: Performed by: OBSTETRICS & GYNECOLOGY

## 2021-06-03 PROCEDURE — 94799 UNLISTED PULMONARY SVC/PX: CPT

## 2021-06-03 PROCEDURE — 63710000001 AMLODIPINE 5 MG TABLET 100 EACH BOX: Performed by: OBSTETRICS & GYNECOLOGY

## 2021-06-03 RX ORDER — HYDROCODONE BITARTRATE AND ACETAMINOPHEN 5; 325 MG/1; MG/1
1 TABLET ORAL EVERY 4 HOURS PRN
Qty: 15 TABLET | Refills: 0 | Status: SHIPPED | OUTPATIENT
Start: 2021-06-03 | End: 2021-06-09

## 2021-06-03 RX ADMIN — SODIUM CHLORIDE, POTASSIUM CHLORIDE, SODIUM LACTATE AND CALCIUM CHLORIDE 100 ML/HR: 600; 310; 30; 20 INJECTION, SOLUTION INTRAVENOUS at 03:50

## 2021-06-03 RX ADMIN — CEFAZOLIN SODIUM 2 G: 2 INJECTION, SOLUTION INTRAVENOUS at 04:00

## 2021-06-03 RX ADMIN — LISINOPRIL: 10 TABLET ORAL at 08:57

## 2021-06-03 NOTE — PLAN OF CARE
Goal Outcome Evaluation:     Progress: improving  Outcome Summary: patient is alert and oriented, voiding spontaneously eager to go  Problem: Adult Inpatient Plan of Care  Goal: Plan of Care Review  Outcome: Ongoing, Progressing  Flowsheets  Taken 6/3/2021 1122 by Teri Madden RN  Progress: improving  Outcome Summary: patient is alert and oriented, voiding spontaneously eager to go  Taken 6/2/2021 1344 by Mushtaq Graham RN  Plan of Care Reviewed With: patient  Goal: Patient-Specific Goal (Individualized)  Outcome: Ongoing, Progressing  Goal: Absence of Hospital-Acquired Illness or Injury  Outcome: Ongoing, Progressing  Intervention: Identify and Manage Fall Risk  Recent Flowsheet Documentation  Taken 6/3/2021 1000 by Teri Madden RN  Safety Promotion/Fall Prevention:   activity supervised   fall prevention program maintained   safety round/check completed  Taken 6/3/2021 0804 by Teri Madden RN  Safety Promotion/Fall Prevention:   fall prevention program maintained   safety round/check completed  Intervention: Prevent Skin Injury  Recent Flowsheet Documentation  Taken 6/3/2021 1000 by Teri Madden RN  Body Position: sitting up in bed  Skin Protection: adhesive use limited  Intervention: Prevent and Manage VTE (venous thromboembolism) Risk  Recent Flowsheet Documentation  Taken 6/3/2021 1000 by Teri Madden RN  VTE Prevention/Management:   bilateral   sequential compression devices on   bleeding risk factor(s) identified  Intervention: Prevent Infection  Recent Flowsheet Documentation  Taken 6/3/2021 1000 by Teri Madden RN  Infection Prevention: rest/sleep promoted  Goal: Optimal Comfort and Wellbeing  Outcome: Ongoing, Progressing  Intervention: Provide Person-Centered Care  Recent Flowsheet Documentation  Taken 6/3/2021 1000 by Teri Madden RN  Trust Relationship/Rapport: care explained  Goal: Readiness for Transition of Care  Outcome: Ongoing, Progressing     Problem:  Pain Acute  Goal: Optimal Pain Control  Outcome: Ongoing, Progressing  Intervention: Develop Pain Management Plan  Recent Flowsheet Documentation  Taken 6/3/2021 1000 by Teri Madden RN  Pain Management Interventions: no interventions per patient request  Intervention: Prevent or Manage Pain  Recent Flowsheet Documentation  Taken 6/3/2021 1000 by Teri Madden RN  Sensory Stimulation Regulation: lighting decreased  Sleep/Rest Enhancement: regular sleep/rest pattern promoted  Intervention: Optimize Psychosocial Wellbeing  Recent Flowsheet Documentation  Taken 6/3/2021 1000 by Teri Madden RN  Supportive Measures: active listening utilized  Diversional Activities: television

## 2021-06-03 NOTE — PROGRESS NOTES
Surgery Post-op Note  [place]    * No Diagnosis Codes entered *    * No Diagnosis Codes entered *    Procedure(s):  ANTERIOR AND POSTERIOR COLPORRHAPY WITH ENTROCELE  CYSTOSCOPY    Subjective     No complaints.  Min bleeding. Pain controlled.  No n;/v quiroga out, not voided yet    Objective   Physical Exam  Vitals:    06/03/21 0804   BP: 137/72   Pulse:    Resp: 18   Temp: 98.1 °F (36.7 °C)   SpO2: 93%     General: awake, alert, comfortable    Pulm: CTAB    CVS: no M/R/G, reg rate    Abd: soft, NT, ND, NABS    Ext: warm, well perfused      Labs:  Lab Results (last 24 hours)     ** No results found for the last 24 hours. **              Intake and Output:    Intake/Output Summary (Last 24 hours) at 6/3/2021 0821  Last data filed at 6/3/2021 0600  Gross per 24 hour   Intake 2402 ml   Output 500 ml   Net 1902 ml       Assessment     The patient is a 81 y.o. female 1 Day Post-Op after anterior posterior repair enterocele repair. Perineorrhaphy/ cysto    Plan     · D/c home today  ·  grandson driving.   · No driving 2 weeks  · Sitz baths\  · rx for hydrocodone sent to [facility]  · Schedule follow up 6 weeks in office.     Estela Chase MD  06/03/21  08:21 EDT

## 2021-06-03 NOTE — PLAN OF CARE
Problem: Adult Inpatient Plan of Care  Goal: Absence of Hospital-Acquired Illness or Injury  Intervention: Identify and Manage Fall Risk  Description: Perform standard risk assessment with a validated tool or comprehensive approach appropriate to the patient on admission; reassess fall risk frequently, with change in status or transfer to another level of care.  Communicate fall injury risk to interprofessional healthcare team.  Determine need for increased observation, equipment and environmental modification, such as low bed and signage, as well as supportive, nonskid footwear.  Adjust safety measures to individual developmental age, stage and identified risk factors.  Reinforce the importance of safety and physical activity with patient and family.  Perform regular intentional rounding to assess need for position change, pain assessment, personal needs, including assistance with toileting.  Recent Flowsheet Documentation  Taken 6/3/2021 0000 by Shine Galeas, RN  Safety Promotion/Fall Prevention:   activity supervised   assistive device/personal items within reach   clutter free environment maintained   elopement precautions   fall prevention program maintained   nonskid shoes/slippers when out of bed   safety round/check completed  Taken 6/2/2021 2200 by Shine Galeas, RN  Safety Promotion/Fall Prevention:   activity supervised   assistive device/personal items within reach   clutter free environment maintained   elopement precautions   fall prevention program maintained   nonskid shoes/slippers when out of bed   safety round/check completed  Taken 6/2/2021 2000 by Shine Galeas, RN  Safety Promotion/Fall Prevention:   activity supervised   assistive device/personal items within reach   clutter free environment maintained   elopement precautions   fall prevention program maintained   nonskid shoes/slippers when out of bed   safety round/check completed  Intervention: Prevent Skin Injury  Description: Assess skin  risk on admission and at regular intervals throughout hospital stay.  Keep all areas of skin (especially folds) clean and dry.  Maintain adequate skin hydration.  Relieve and redistribute pressure and protect bony prominences; implement measures based on patient-specific risk factors.  Match turning and repositioning schedule to clinical condition.  Encourage weight shift frequently; assist with reposition if unable to complete independently.  Float heels off bed. Avoid pressure on the Achilles tendon.  Keep skin free from extended contact with medical devices.  Use aids (e.g., slide boards, mechanical lift) during transfer.  Recent Flowsheet Documentation  Taken 6/3/2021 0000 by Shine Galeas RN  Body Position: position changed independently  Taken 6/2/2021 2200 by Shine Galeas RN  Body Position: position changed independently  Taken 6/2/2021 2000 by Shine Galeas RN  Body Position: position changed independently  Skin Protection:   adhesive use limited   incontinence pads utilized   protective footwear used   pulse oximeter probe site changed   skin-to-skin areas padded   tubing/devices free from skin contact  Intervention: Prevent and Manage VTE (venous thromboembolism) Risk  Description: Assess for VTE risk.  Encourage/assist with early ambulation.  Initiate and maintain compression or other therapy, as indicated based on identified risk in accordance with organizational protocol/provider order.  Encourage both active and passive leg exercises while in bed, if unable to ambulate.  Recent Flowsheet Documentation  Taken 6/3/2021 0000 by Shine Galeas RN  VTE Prevention/Management:   bilateral   dorsiflexion/plantar flexion performed   sequential compression devices on  Taken 6/2/2021 2200 by Shine Galeas RN  VTE Prevention/Management:   bilateral   dorsiflexion/plantar flexion performed   sequential compression devices on  Taken 6/2/2021 2000 by Shine Galeas RN  VTE Prevention/Management:   bilateral    dorsiflexion/plantar flexion performed   sequential compression devices on   bleeding risk factor(s) identified  Goal: Optimal Comfort and Wellbeing  Intervention: Provide Person-Centered Care  Description: Use a family-focused approach to care.  Develop trust and rapport by proactively providing information, encouraging questions, addressing concerns and offering reassurance.  Acknowledge emotional response to hospitalization.  Recognize and utilize personal coping strategies.  Honor spiritual and cultural preferences.  Recent Flowsheet Documentation  Taken 6/2/2021 2000 by Shine Galeas RN  Trust Relationship/Rapport:   care explained   choices provided   emotional support provided   empathic listening provided   questions answered   questions encouraged   reassurance provided   thoughts/feelings acknowledged     Problem: Pain Acute  Goal: Optimal Pain Control  Outcome: Ongoing, Progressing  Intervention: Develop Pain Management Plan  Description: Acknowledge patient as the expert in pain self-management.  Use a consistent, validated tool for pain assessment.  Set pain management goals; determine acceptable level of discomfort to allow for maximal functioning.  Determine [id number] pain management plan, including both pharmacologic and nonpharmacologic measures; integrate management of chronic (persistent) pain.  Identify and integrate past successful measures, if able.  Encourage patient and caregiver involvement in pain assessment, interventions and safety measures.  Re-evaluate plan regularly.  Flowsheets (Taken 6/2/2021 1550 by Dillon Campbell, RN)  Pain Management Interventions: no interventions per patient request  Intervention: Prevent or Manage Pain  Description: Evaluate pain level, effect of treatment and patient response at regular intervals.  Minimize pain stimuli; coordinate care and adjust environment (e.g., light, noise, unnecessary movement); promote sleep/rest.  Match pharmacologic analgesia  to severity and type of pain mechanism; consider multimodal approach (e.g., nonopioid, opioid, adjuvant).  Provide medication at regular intervals; titrate to patient response; premedicate for painful procedures.  Manage breakthrough pain with additional doses; consider rotation or switching medication.  Monitor for signs of substance tolerance (increased dose to reach desired effect, decreased effect with same dose).  Manage medication-induced effects, such as constipation, nausea, urinary retention, somnolence and dizziness.  Consider nonpharmacologic pain interventions to improve function (e.g., massage, transcutaneous electrical nerve stimulation, vibration, heat or cold application, immobilization, hydrotherapy).  Consider addition of complementary or alternative therapy, such as acupuncture, hypnosis or therapeutic touch.  Flowsheets  Taken 6/3/2021 0121  Sleep/Rest Enhancement:   regular sleep/rest pattern promoted   relaxation techniques promoted  Taken 6/2/2021 2000  Sensory Stimulation Regulation:   care clustered   quiet environment promoted   television on  Intervention: Optimize Psychosocial Wellbeing  Description: Facilitate patient’s self-control over pain by providing pain information and allowing choices in treatment.  Consider and address emotional response to pain.  Explore and promote use of coping strategies; address barriers to successful coping.  Evaluate and assist with psychosocial, cultural and spiritual factors impacting pain.  Modify pain perception by using cognitive-behavioral techniques such as distraction, guided imagery, meditation, relaxation or music.  Flowsheets (Taken 6/2/2021 2000)  Supportive Measures:   active listening utilized   problem-solving facilitated   relaxation techniques promoted   self-care encouraged   self-reflection promoted   self-responsibility promoted   verbalization of feelings encouraged  Diversional Activities:   television   smartphone   Goal Outcome  Evaluation:

## 2021-07-12 ENCOUNTER — HOSPITAL ENCOUNTER (OUTPATIENT)
Dept: MAMMOGRAPHY | Facility: HOSPITAL | Age: 81
Discharge: HOME | End: 2021-07-12

## 2021-07-12 DIAGNOSIS — R92.8 ABNORMAL MAMMOGRAM: ICD-10-CM

## 2021-07-12 PROCEDURE — 19081 BX BREAST 1ST LESION STRTCTC: CPT | Performed by: RADIOLOGY

## 2021-07-12 PROCEDURE — 25010000003 LIDOCAINE 1 % SOLUTION: Performed by: RADIOLOGY

## 2021-07-12 PROCEDURE — 88305 TISSUE EXAM BY PATHOLOGIST: CPT | Performed by: OBSTETRICS & GYNECOLOGY

## 2021-07-12 PROCEDURE — 77065 DX MAMMO INCL CAD UNI: CPT | Performed by: RADIOLOGY

## 2021-07-12 PROCEDURE — 76098 X-RAY EXAM SURGICAL SPECIMEN: CPT

## 2021-07-12 PROCEDURE — A4648 IMPLANTABLE TISSUE MARKER: HCPCS

## 2021-07-12 RX ORDER — LIDOCAINE HYDROCHLORIDE AND EPINEPHRINE 10; 10 MG/ML; UG/ML
10 INJECTION, SOLUTION INFILTRATION; PERINEURAL ONCE
Status: COMPLETED | OUTPATIENT
Start: 2021-07-12 | End: 2021-07-12

## 2021-07-12 RX ORDER — LIDOCAINE HYDROCHLORIDE 10 MG/ML
5 INJECTION, SOLUTION INFILTRATION; PERINEURAL ONCE
Status: COMPLETED | OUTPATIENT
Start: 2021-07-12 | End: 2021-07-12

## 2021-07-12 RX ADMIN — LIDOCAINE HYDROCHLORIDE,EPINEPHRINE BITARTRATE 6 ML: 10; .01 INJECTION, SOLUTION INFILTRATION; PERINEURAL at 09:31

## 2021-07-12 RX ADMIN — LIDOCAINE HYDROCHLORIDE 0.5 ML: 10 INJECTION, SOLUTION INFILTRATION; PERINEURAL at 09:31

## 2021-07-12 NOTE — PROGRESS NOTES
Alert and orientated. Denies discomfort. No active bleeding. Steri-strips not visualized, gauze dressing intact. Ice packs given. Verbalizes and demonstrates understanding of post-care instructions, written copy given.

## 2021-07-13 LAB
CYTO UR: NORMAL
LAB AP CASE REPORT: NORMAL
LAB AP CLINICAL INFORMATION: NORMAL
LAB AP DIAGNOSIS COMMENT: NORMAL
PATH REPORT.FINAL DX SPEC: NORMAL
PATH REPORT.GROSS SPEC: NORMAL

## 2021-07-14 ENCOUNTER — TELEPHONE (OUTPATIENT)
Dept: MAMMOGRAPHY | Facility: HOSPITAL | Age: 81
End: 2021-07-14

## 2021-07-14 NOTE — TELEPHONE ENCOUNTER
Pt notified of pathology results and recommendation. Verbalizes understanding. Denies discomfort.  Denies signs and symptoms of infection. Questions answered, verbalized understanding.

## 2021-07-20 DIAGNOSIS — R92.8 ABNORMAL MAMMOGRAM: Primary | ICD-10-CM

## 2022-01-26 ENCOUNTER — APPOINTMENT (OUTPATIENT)
Dept: MAMMOGRAPHY | Facility: HOSPITAL | Age: 82
End: 2022-01-26

## 2022-03-29 ENCOUNTER — HOSPITAL ENCOUNTER (OUTPATIENT)
Dept: MAMMOGRAPHY | Facility: HOSPITAL | Age: 82
Discharge: HOME | End: 2022-03-29
Admitting: OBSTETRICS & GYNECOLOGY

## 2022-03-29 DIAGNOSIS — R92.8 ABNORMAL MAMMOGRAM: ICD-10-CM

## 2022-03-29 PROCEDURE — 77065 DX MAMMO INCL CAD UNI: CPT | Performed by: RADIOLOGY

## 2022-03-29 PROCEDURE — G0279 TOMOSYNTHESIS, MAMMO: HCPCS | Performed by: RADIOLOGY

## 2022-03-29 PROCEDURE — G0279 TOMOSYNTHESIS, MAMMO: HCPCS

## 2022-03-29 PROCEDURE — 77065 DX MAMMO INCL CAD UNI: CPT

## 2022-11-14 ENCOUNTER — TRANSCRIBE ORDERS (OUTPATIENT)
Dept: ADMINISTRATIVE | Facility: HOSPITAL | Age: 82
End: 2022-11-14

## 2022-11-14 DIAGNOSIS — Z12.31 VISIT FOR SCREENING MAMMOGRAM: Primary | ICD-10-CM

## 2023-01-06 ENCOUNTER — HOSPITAL ENCOUNTER (OUTPATIENT)
Dept: MAMMOGRAPHY | Facility: HOSPITAL | Age: 83
Discharge: HOME | End: 2023-01-06
Admitting: INTERNAL MEDICINE
Payer: MEDICARE

## 2023-01-06 DIAGNOSIS — Z12.31 VISIT FOR SCREENING MAMMOGRAM: ICD-10-CM

## 2023-01-06 PROCEDURE — 77067 SCR MAMMO BI INCL CAD: CPT | Performed by: RADIOLOGY

## 2023-01-06 PROCEDURE — 77063 BREAST TOMOSYNTHESIS BI: CPT

## 2023-01-06 PROCEDURE — 77063 BREAST TOMOSYNTHESIS BI: CPT | Performed by: RADIOLOGY

## 2023-01-06 PROCEDURE — 77067 SCR MAMMO BI INCL CAD: CPT

## 2024-07-12 ENCOUNTER — HOSPITAL ENCOUNTER (OUTPATIENT)
Dept: HOSPITAL 22 - RAD | Age: 84
Discharge: HOME | End: 2024-07-12
Payer: MEDICARE

## 2024-07-12 DIAGNOSIS — M81.0: ICD-10-CM

## 2024-07-12 DIAGNOSIS — Z13.820: Primary | ICD-10-CM

## 2024-07-12 DIAGNOSIS — Z92.89: ICD-10-CM

## 2024-07-12 PROCEDURE — 77080 DXA BONE DENSITY AXIAL: CPT

## (undated) DEVICE — MEDI-VAC YANKAUER SUCTION HANDLE W/BULBOUS TIP: Brand: CARDINAL HEALTH

## (undated) DEVICE — NDL HYPO ECLPS SFTY 22G 1 1/2IN

## (undated) DEVICE — LEX VAGINAL HYSTERECTOMY: Brand: MEDLINE INDUSTRIES, INC.

## (undated) DEVICE — GLV SURG SENSICARE PI MIC PF SZ6.5 LF STRL

## (undated) DEVICE — DRAPE,TOP,102X53,STERILE: Brand: MEDLINE

## (undated) DEVICE — ANTIBACTERIAL UNDYED BRAIDED (POLYGLACTIN 910), SYNTHETIC ABSORBABLE SUTURE: Brand: COATED VICRYL

## (undated) DEVICE — SUT VICYL PLS CTD ANTIB BR 1 27IN VIL

## (undated) DEVICE — SUT VIC 2/0 CT1 27IN J259H

## (undated) DEVICE — DECANT BG O JET

## (undated) DEVICE — ELECTRD BLD EZ CLN STD 4IN

## (undated) DEVICE — TUBING, SUCTION, 1/4" X 10', STRAIGHT: Brand: MEDLINE

## (undated) DEVICE — SPONGE,LAP,4"X18",XR,ST,5/PK,40PK/CS: Brand: MEDLINE INDUSTRIES, INC.

## (undated) DEVICE — IRRIGATOR BULB ASEPTO 60CC STRL

## (undated) DEVICE — INTENDED FOR TISSUE SEPARATION, AND OTHER PROCEDURES THAT REQUIRE A SHARP SURGICAL BLADE TO PUNCTURE OR CUT.: Brand: BARD-PARKER ® STAINLESS STEEL BLADES

## (undated) DEVICE — GLV SURG SIGNATURE TOUCH PF LTX 7 STRL

## (undated) DEVICE — GLV SURG SENSICARE PI MIC PF SZ7 LF STRL